# Patient Record
Sex: FEMALE | Race: OTHER | HISPANIC OR LATINO | URBAN - METROPOLITAN AREA
[De-identification: names, ages, dates, MRNs, and addresses within clinical notes are randomized per-mention and may not be internally consistent; named-entity substitution may affect disease eponyms.]

---

## 2020-01-01 ENCOUNTER — INPATIENT (INPATIENT)
Facility: HOSPITAL | Age: 85
LOS: 6 days | End: 2020-04-30
Attending: ORTHOPAEDIC SURGERY | Admitting: ORTHOPAEDIC SURGERY
Payer: MEDICARE

## 2020-01-01 VITALS
HEART RATE: 97 BPM | SYSTOLIC BLOOD PRESSURE: 126 MMHG | DIASTOLIC BLOOD PRESSURE: 74 MMHG | TEMPERATURE: 98 F | RESPIRATION RATE: 18 BRPM | OXYGEN SATURATION: 97 %

## 2020-01-01 VITALS
HEART RATE: 80 BPM | TEMPERATURE: 97 F | RESPIRATION RATE: 20 BRPM | OXYGEN SATURATION: 90 % | DIASTOLIC BLOOD PRESSURE: 61 MMHG | SYSTOLIC BLOOD PRESSURE: 134 MMHG

## 2020-01-01 DIAGNOSIS — I48.91 UNSPECIFIED ATRIAL FIBRILLATION: ICD-10-CM

## 2020-01-01 DIAGNOSIS — J96.01 ACUTE RESPIRATORY FAILURE WITH HYPOXIA: ICD-10-CM

## 2020-01-01 DIAGNOSIS — E87.6 HYPOKALEMIA: ICD-10-CM

## 2020-01-01 DIAGNOSIS — R13.10 DYSPHAGIA, UNSPECIFIED: ICD-10-CM

## 2020-01-01 DIAGNOSIS — I25.10 ATHEROSCLEROTIC HEART DISEASE OF NATIVE CORONARY ARTERY WITHOUT ANGINA PECTORIS: ICD-10-CM

## 2020-01-01 DIAGNOSIS — E11.9 TYPE 2 DIABETES MELLITUS WITHOUT COMPLICATIONS: ICD-10-CM

## 2020-01-01 DIAGNOSIS — I63.9 CEREBRAL INFARCTION, UNSPECIFIED: ICD-10-CM

## 2020-01-01 DIAGNOSIS — Z71.89 OTHER SPECIFIED COUNSELING: ICD-10-CM

## 2020-01-01 DIAGNOSIS — Z29.9 ENCOUNTER FOR PROPHYLACTIC MEASURES, UNSPECIFIED: ICD-10-CM

## 2020-01-01 DIAGNOSIS — R06.03 ACUTE RESPIRATORY DISTRESS: ICD-10-CM

## 2020-01-01 DIAGNOSIS — E78.5 HYPERLIPIDEMIA, UNSPECIFIED: ICD-10-CM

## 2020-01-01 DIAGNOSIS — S72.141A DISPLACED INTERTROCHANTERIC FRACTURE OF RIGHT FEMUR, INITIAL ENCOUNTER FOR CLOSED FRACTURE: ICD-10-CM

## 2020-01-01 DIAGNOSIS — Z01.818 ENCOUNTER FOR OTHER PREPROCEDURAL EXAMINATION: ICD-10-CM

## 2020-01-01 DIAGNOSIS — Z51.5 ENCOUNTER FOR PALLIATIVE CARE: ICD-10-CM

## 2020-01-01 DIAGNOSIS — I10 ESSENTIAL (PRIMARY) HYPERTENSION: ICD-10-CM

## 2020-01-01 LAB
24R-OH-CALCIDIOL SERPL-MCNC: 8.1 NG/ML — LOW (ref 30–80)
ALBUMIN SERPL ELPH-MCNC: 3.7 G/DL — SIGNIFICANT CHANGE UP (ref 3.3–5)
ALBUMIN SERPL ELPH-MCNC: 3.8 G/DL — SIGNIFICANT CHANGE UP (ref 3.3–5)
ALP SERPL-CCNC: 100 U/L — SIGNIFICANT CHANGE UP (ref 40–120)
ALT FLD-CCNC: 13 U/L — SIGNIFICANT CHANGE UP (ref 4–33)
ANION GAP SERPL CALC-SCNC: 11 MMO/L — SIGNIFICANT CHANGE UP (ref 7–14)
ANION GAP SERPL CALC-SCNC: 13 MMO/L — SIGNIFICANT CHANGE UP (ref 7–14)
ANION GAP SERPL CALC-SCNC: 13 MMO/L — SIGNIFICANT CHANGE UP (ref 7–14)
ANION GAP SERPL CALC-SCNC: 14 MMO/L — SIGNIFICANT CHANGE UP (ref 7–14)
ANION GAP SERPL CALC-SCNC: 15 MMO/L — HIGH (ref 7–14)
ANION GAP SERPL CALC-SCNC: 16 MMO/L — HIGH (ref 7–14)
ANION GAP SERPL CALC-SCNC: 17 MMO/L — HIGH (ref 7–14)
ANION GAP SERPL CALC-SCNC: 20 MMO/L — HIGH (ref 7–14)
APTT BLD: 20.7 SEC — LOW (ref 27.5–36.3)
APTT BLD: 22.8 SEC — LOW (ref 27.5–36.3)
APTT BLD: 23.1 SEC — LOW (ref 27.5–36.3)
APTT BLD: 24.1 SEC — LOW (ref 27.5–36.3)
APTT BLD: 28 SEC — SIGNIFICANT CHANGE UP (ref 27.5–36.3)
AST SERPL-CCNC: 14 U/L — SIGNIFICANT CHANGE UP (ref 4–32)
BASE EXCESS BLDV CALC-SCNC: -3.6 MMOL/L — SIGNIFICANT CHANGE UP
BASOPHILS # BLD AUTO: 0.02 K/UL — SIGNIFICANT CHANGE UP (ref 0–0.2)
BASOPHILS NFR BLD AUTO: 0.2 % — SIGNIFICANT CHANGE UP (ref 0–2)
BILIRUB SERPL-MCNC: 0.7 MG/DL — SIGNIFICANT CHANGE UP (ref 0.2–1.2)
BLD GP AB SCN SERPL QL: NEGATIVE — SIGNIFICANT CHANGE UP
BLOOD GAS VENOUS - CREATININE: 0.77 MG/DL — SIGNIFICANT CHANGE UP (ref 0.5–1.3)
BUN SERPL-MCNC: 10 MG/DL — SIGNIFICANT CHANGE UP (ref 7–23)
BUN SERPL-MCNC: 11 MG/DL — SIGNIFICANT CHANGE UP (ref 7–23)
BUN SERPL-MCNC: 12 MG/DL — SIGNIFICANT CHANGE UP (ref 7–23)
BUN SERPL-MCNC: 13 MG/DL — SIGNIFICANT CHANGE UP (ref 7–23)
BUN SERPL-MCNC: 14 MG/DL — SIGNIFICANT CHANGE UP (ref 7–23)
BUN SERPL-MCNC: 15 MG/DL — SIGNIFICANT CHANGE UP (ref 7–23)
BUN SERPL-MCNC: 17 MG/DL — SIGNIFICANT CHANGE UP (ref 7–23)
BUN SERPL-MCNC: 18 MG/DL — SIGNIFICANT CHANGE UP (ref 7–23)
BUN SERPL-MCNC: 19 MG/DL — SIGNIFICANT CHANGE UP (ref 7–23)
BUN SERPL-MCNC: 20 MG/DL — SIGNIFICANT CHANGE UP (ref 7–23)
BUN SERPL-MCNC: 24 MG/DL — HIGH (ref 7–23)
BUN SERPL-MCNC: 29 MG/DL — HIGH (ref 7–23)
BUN SERPL-MCNC: 9 MG/DL — SIGNIFICANT CHANGE UP (ref 7–23)
CALCIUM SERPL-MCNC: 7.7 MG/DL — LOW (ref 8.4–10.5)
CALCIUM SERPL-MCNC: 7.9 MG/DL — LOW (ref 8.4–10.5)
CALCIUM SERPL-MCNC: 8 MG/DL — LOW (ref 8.4–10.5)
CALCIUM SERPL-MCNC: 8.1 MG/DL — LOW (ref 8.4–10.5)
CALCIUM SERPL-MCNC: 8.2 MG/DL — LOW (ref 8.4–10.5)
CALCIUM SERPL-MCNC: 8.2 MG/DL — LOW (ref 8.4–10.5)
CALCIUM SERPL-MCNC: 8.4 MG/DL — SIGNIFICANT CHANGE UP (ref 8.4–10.5)
CALCIUM SERPL-MCNC: 8.4 MG/DL — SIGNIFICANT CHANGE UP (ref 8.4–10.5)
CALCIUM SERPL-MCNC: 8.6 MG/DL — SIGNIFICANT CHANGE UP (ref 8.4–10.5)
CALCIUM SERPL-MCNC: 8.7 MG/DL — SIGNIFICANT CHANGE UP (ref 8.4–10.5)
CALCIUM SERPL-MCNC: 9 MG/DL — SIGNIFICANT CHANGE UP (ref 8.4–10.5)
CHLORIDE BLDV-SCNC: 104 MMOL/L — SIGNIFICANT CHANGE UP (ref 96–108)
CHLORIDE SERPL-SCNC: 101 MMOL/L — SIGNIFICANT CHANGE UP (ref 98–107)
CHLORIDE SERPL-SCNC: 101 MMOL/L — SIGNIFICANT CHANGE UP (ref 98–107)
CHLORIDE SERPL-SCNC: 102 MMOL/L — SIGNIFICANT CHANGE UP (ref 98–107)
CHLORIDE SERPL-SCNC: 103 MMOL/L — SIGNIFICANT CHANGE UP (ref 98–107)
CHLORIDE SERPL-SCNC: 103 MMOL/L — SIGNIFICANT CHANGE UP (ref 98–107)
CHLORIDE SERPL-SCNC: 106 MMOL/L — SIGNIFICANT CHANGE UP (ref 98–107)
CHLORIDE SERPL-SCNC: 106 MMOL/L — SIGNIFICANT CHANGE UP (ref 98–107)
CHLORIDE SERPL-SCNC: 107 MMOL/L — SIGNIFICANT CHANGE UP (ref 98–107)
CHLORIDE SERPL-SCNC: 109 MMOL/L — HIGH (ref 98–107)
CHLORIDE SERPL-SCNC: 99 MMOL/L — SIGNIFICANT CHANGE UP (ref 98–107)
CHLORIDE SERPL-SCNC: 99 MMOL/L — SIGNIFICANT CHANGE UP (ref 98–107)
CHOLEST SERPL-MCNC: 101 MG/DL — LOW (ref 120–199)
CHOLEST SERPL-MCNC: 95 MG/DL — LOW (ref 120–199)
CO2 SERPL-SCNC: 14 MMOL/L — LOW (ref 22–31)
CO2 SERPL-SCNC: 16 MMOL/L — LOW (ref 22–31)
CO2 SERPL-SCNC: 17 MMOL/L — LOW (ref 22–31)
CO2 SERPL-SCNC: 18 MMOL/L — LOW (ref 22–31)
CO2 SERPL-SCNC: 19 MMOL/L — LOW (ref 22–31)
CO2 SERPL-SCNC: 20 MMOL/L — LOW (ref 22–31)
CO2 SERPL-SCNC: 22 MMOL/L — SIGNIFICANT CHANGE UP (ref 22–31)
CREAT SERPL-MCNC: 0.49 MG/DL — LOW (ref 0.5–1.3)
CREAT SERPL-MCNC: 0.52 MG/DL — SIGNIFICANT CHANGE UP (ref 0.5–1.3)
CREAT SERPL-MCNC: 0.53 MG/DL — SIGNIFICANT CHANGE UP (ref 0.5–1.3)
CREAT SERPL-MCNC: 0.58 MG/DL — SIGNIFICANT CHANGE UP (ref 0.5–1.3)
CREAT SERPL-MCNC: 0.58 MG/DL — SIGNIFICANT CHANGE UP (ref 0.5–1.3)
CREAT SERPL-MCNC: 0.6 MG/DL — SIGNIFICANT CHANGE UP (ref 0.5–1.3)
CREAT SERPL-MCNC: 0.61 MG/DL — SIGNIFICANT CHANGE UP (ref 0.5–1.3)
CREAT SERPL-MCNC: 0.67 MG/DL — SIGNIFICANT CHANGE UP (ref 0.5–1.3)
CREAT SERPL-MCNC: 0.68 MG/DL — SIGNIFICANT CHANGE UP (ref 0.5–1.3)
CREAT SERPL-MCNC: 0.71 MG/DL — SIGNIFICANT CHANGE UP (ref 0.5–1.3)
CREAT SERPL-MCNC: 0.71 MG/DL — SIGNIFICANT CHANGE UP (ref 0.5–1.3)
CREAT SERPL-MCNC: 0.72 MG/DL — SIGNIFICANT CHANGE UP (ref 0.5–1.3)
CREAT SERPL-MCNC: 0.73 MG/DL — SIGNIFICANT CHANGE UP (ref 0.5–1.3)
EOSINOPHIL # BLD AUTO: 0.01 K/UL — SIGNIFICANT CHANGE UP (ref 0–0.5)
EOSINOPHIL NFR BLD AUTO: 0.1 % — SIGNIFICANT CHANGE UP (ref 0–6)
GAS PNL BLDV: 133 MMOL/L — LOW (ref 136–146)
GLUCOSE BLDC GLUCOMTR-MCNC: 188 MG/DL — HIGH (ref 70–99)
GLUCOSE BLDC GLUCOMTR-MCNC: 205 MG/DL — HIGH (ref 70–99)
GLUCOSE BLDC GLUCOMTR-MCNC: 205 MG/DL — HIGH (ref 70–99)
GLUCOSE BLDC GLUCOMTR-MCNC: 229 MG/DL — HIGH (ref 70–99)
GLUCOSE BLDC GLUCOMTR-MCNC: 236 MG/DL — HIGH (ref 70–99)
GLUCOSE BLDC GLUCOMTR-MCNC: 237 MG/DL — HIGH (ref 70–99)
GLUCOSE BLDC GLUCOMTR-MCNC: 241 MG/DL — HIGH (ref 70–99)
GLUCOSE BLDC GLUCOMTR-MCNC: 246 MG/DL — HIGH (ref 70–99)
GLUCOSE BLDC GLUCOMTR-MCNC: 259 MG/DL — HIGH (ref 70–99)
GLUCOSE BLDC GLUCOMTR-MCNC: 261 MG/DL — HIGH (ref 70–99)
GLUCOSE BLDC GLUCOMTR-MCNC: 274 MG/DL — HIGH (ref 70–99)
GLUCOSE BLDC GLUCOMTR-MCNC: 275 MG/DL — HIGH (ref 70–99)
GLUCOSE BLDC GLUCOMTR-MCNC: 275 MG/DL — HIGH (ref 70–99)
GLUCOSE BLDC GLUCOMTR-MCNC: 283 MG/DL — HIGH (ref 70–99)
GLUCOSE BLDC GLUCOMTR-MCNC: 287 MG/DL — HIGH (ref 70–99)
GLUCOSE BLDC GLUCOMTR-MCNC: 289 MG/DL — HIGH (ref 70–99)
GLUCOSE BLDC GLUCOMTR-MCNC: 293 MG/DL — HIGH (ref 70–99)
GLUCOSE BLDC GLUCOMTR-MCNC: 295 MG/DL — HIGH (ref 70–99)
GLUCOSE BLDC GLUCOMTR-MCNC: 299 MG/DL — HIGH (ref 70–99)
GLUCOSE BLDC GLUCOMTR-MCNC: 309 MG/DL — HIGH (ref 70–99)
GLUCOSE BLDC GLUCOMTR-MCNC: 316 MG/DL — HIGH (ref 70–99)
GLUCOSE BLDC GLUCOMTR-MCNC: 320 MG/DL — HIGH (ref 70–99)
GLUCOSE BLDC GLUCOMTR-MCNC: 321 MG/DL — HIGH (ref 70–99)
GLUCOSE BLDC GLUCOMTR-MCNC: 330 MG/DL — HIGH (ref 70–99)
GLUCOSE BLDC GLUCOMTR-MCNC: 333 MG/DL — HIGH (ref 70–99)
GLUCOSE BLDC GLUCOMTR-MCNC: 344 MG/DL — HIGH (ref 70–99)
GLUCOSE BLDC GLUCOMTR-MCNC: 353 MG/DL — HIGH (ref 70–99)
GLUCOSE BLDC GLUCOMTR-MCNC: 353 MG/DL — HIGH (ref 70–99)
GLUCOSE BLDC GLUCOMTR-MCNC: 355 MG/DL — HIGH (ref 70–99)
GLUCOSE BLDC GLUCOMTR-MCNC: 379 MG/DL — HIGH (ref 70–99)
GLUCOSE BLDC GLUCOMTR-MCNC: 424 MG/DL — HIGH (ref 70–99)
GLUCOSE BLDC GLUCOMTR-MCNC: 434 MG/DL — HIGH (ref 70–99)
GLUCOSE BLDC GLUCOMTR-MCNC: 467 MG/DL — CRITICAL HIGH (ref 70–99)
GLUCOSE BLDV-MCNC: 319 MG/DL — HIGH (ref 70–99)
GLUCOSE SERPL-MCNC: 119 MG/DL — HIGH (ref 70–99)
GLUCOSE SERPL-MCNC: 199 MG/DL — HIGH (ref 70–99)
GLUCOSE SERPL-MCNC: 237 MG/DL — HIGH (ref 70–99)
GLUCOSE SERPL-MCNC: 265 MG/DL — HIGH (ref 70–99)
GLUCOSE SERPL-MCNC: 280 MG/DL — HIGH (ref 70–99)
GLUCOSE SERPL-MCNC: 288 MG/DL — HIGH (ref 70–99)
GLUCOSE SERPL-MCNC: 294 MG/DL — HIGH (ref 70–99)
GLUCOSE SERPL-MCNC: 299 MG/DL — HIGH (ref 70–99)
GLUCOSE SERPL-MCNC: 310 MG/DL — HIGH (ref 70–99)
GLUCOSE SERPL-MCNC: 335 MG/DL — HIGH (ref 70–99)
GLUCOSE SERPL-MCNC: 336 MG/DL — HIGH (ref 70–99)
GLUCOSE SERPL-MCNC: 372 MG/DL — HIGH (ref 70–99)
GLUCOSE SERPL-MCNC: 417 MG/DL — HIGH (ref 70–99)
HBA1C BLD-MCNC: 8.4 % — HIGH (ref 4–5.6)
HBA1C BLD-MCNC: 8.8 % — HIGH (ref 4–5.6)
HCO3 BLDV-SCNC: 21 MMOL/L — SIGNIFICANT CHANGE UP (ref 20–27)
HCT VFR BLD CALC: 26.3 % — LOW (ref 34.5–45)
HCT VFR BLD CALC: 26.4 % — LOW (ref 34.5–45)
HCT VFR BLD CALC: 27.6 % — LOW (ref 34.5–45)
HCT VFR BLD CALC: 28 % — LOW (ref 34.5–45)
HCT VFR BLD CALC: 28.3 % — LOW (ref 34.5–45)
HCT VFR BLD CALC: 30.9 % — LOW (ref 34.5–45)
HCT VFR BLD CALC: 32.8 % — LOW (ref 34.5–45)
HCT VFR BLD CALC: 32.9 % — LOW (ref 34.5–45)
HCT VFR BLD CALC: 33.2 % — LOW (ref 34.5–45)
HCT VFR BLD CALC: 35.3 % — SIGNIFICANT CHANGE UP (ref 34.5–45)
HCT VFR BLDV CALC: 37.7 % — SIGNIFICANT CHANGE UP (ref 34.5–45)
HDLC SERPL-MCNC: 46 MG/DL — SIGNIFICANT CHANGE UP (ref 45–65)
HDLC SERPL-MCNC: 47 MG/DL — SIGNIFICANT CHANGE UP (ref 45–65)
HGB BLD-MCNC: 10.1 G/DL — LOW (ref 11.5–15.5)
HGB BLD-MCNC: 10.5 G/DL — LOW (ref 11.5–15.5)
HGB BLD-MCNC: 11 G/DL — LOW (ref 11.5–15.5)
HGB BLD-MCNC: 11.3 G/DL — LOW (ref 11.5–15.5)
HGB BLD-MCNC: 11.6 G/DL — SIGNIFICANT CHANGE UP (ref 11.5–15.5)
HGB BLD-MCNC: 8.8 G/DL — LOW (ref 11.5–15.5)
HGB BLD-MCNC: 8.8 G/DL — LOW (ref 11.5–15.5)
HGB BLD-MCNC: 9.1 G/DL — LOW (ref 11.5–15.5)
HGB BLD-MCNC: 9.2 G/DL — LOW (ref 11.5–15.5)
HGB BLD-MCNC: 9.3 G/DL — LOW (ref 11.5–15.5)
HGB BLDV-MCNC: 12.3 G/DL — SIGNIFICANT CHANGE UP (ref 11.5–15.5)
IMM GRANULOCYTES NFR BLD AUTO: 1.4 % — SIGNIFICANT CHANGE UP (ref 0–1.5)
INR BLD: 1.02 — SIGNIFICANT CHANGE UP (ref 0.88–1.17)
INR BLD: 1.03 — SIGNIFICANT CHANGE UP (ref 0.88–1.17)
INR BLD: 1.08 — SIGNIFICANT CHANGE UP (ref 0.88–1.17)
INR BLD: 1.15 — SIGNIFICANT CHANGE UP (ref 0.88–1.17)
INR BLD: 1.16 — SIGNIFICANT CHANGE UP (ref 0.88–1.17)
LACTATE BLDV-MCNC: 1.7 MMOL/L — SIGNIFICANT CHANGE UP (ref 0.5–2)
LIPID PNL WITH DIRECT LDL SERPL: 37 MG/DL — SIGNIFICANT CHANGE UP
LIPID PNL WITH DIRECT LDL SERPL: 38 MG/DL — SIGNIFICANT CHANGE UP
LYMPHOCYTES # BLD AUTO: 1.52 K/UL — SIGNIFICANT CHANGE UP (ref 1–3.3)
LYMPHOCYTES # BLD AUTO: 14.3 % — SIGNIFICANT CHANGE UP (ref 13–44)
MAGNESIUM SERPL-MCNC: 1.3 MG/DL — LOW (ref 1.6–2.6)
MAGNESIUM SERPL-MCNC: 1.4 MG/DL — LOW (ref 1.6–2.6)
MAGNESIUM SERPL-MCNC: 1.8 MG/DL — SIGNIFICANT CHANGE UP (ref 1.6–2.6)
MAGNESIUM SERPL-MCNC: 1.8 MG/DL — SIGNIFICANT CHANGE UP (ref 1.6–2.6)
MCHC RBC-ENTMCNC: 30.9 PG — SIGNIFICANT CHANGE UP (ref 27–34)
MCHC RBC-ENTMCNC: 31 PG — SIGNIFICANT CHANGE UP (ref 27–34)
MCHC RBC-ENTMCNC: 31 PG — SIGNIFICANT CHANGE UP (ref 27–34)
MCHC RBC-ENTMCNC: 31.1 PG — SIGNIFICANT CHANGE UP (ref 27–34)
MCHC RBC-ENTMCNC: 31.1 PG — SIGNIFICANT CHANGE UP (ref 27–34)
MCHC RBC-ENTMCNC: 31.4 PG — SIGNIFICANT CHANGE UP (ref 27–34)
MCHC RBC-ENTMCNC: 31.5 PG — SIGNIFICANT CHANGE UP (ref 27–34)
MCHC RBC-ENTMCNC: 31.6 PG — SIGNIFICANT CHANGE UP (ref 27–34)
MCHC RBC-ENTMCNC: 31.7 PG — SIGNIFICANT CHANGE UP (ref 27–34)
MCHC RBC-ENTMCNC: 31.9 % — LOW (ref 32–36)
MCHC RBC-ENTMCNC: 32.1 PG — SIGNIFICANT CHANGE UP (ref 27–34)
MCHC RBC-ENTMCNC: 32.5 % — SIGNIFICANT CHANGE UP (ref 32–36)
MCHC RBC-ENTMCNC: 32.7 % — SIGNIFICANT CHANGE UP (ref 32–36)
MCHC RBC-ENTMCNC: 32.9 % — SIGNIFICANT CHANGE UP (ref 32–36)
MCHC RBC-ENTMCNC: 33 % — SIGNIFICANT CHANGE UP (ref 32–36)
MCHC RBC-ENTMCNC: 33.2 % — SIGNIFICANT CHANGE UP (ref 32–36)
MCHC RBC-ENTMCNC: 33.3 % — SIGNIFICANT CHANGE UP (ref 32–36)
MCHC RBC-ENTMCNC: 33.5 % — SIGNIFICANT CHANGE UP (ref 32–36)
MCHC RBC-ENTMCNC: 33.5 % — SIGNIFICANT CHANGE UP (ref 32–36)
MCHC RBC-ENTMCNC: 34 % — SIGNIFICANT CHANGE UP (ref 32–36)
MCV RBC AUTO: 92.6 FL — SIGNIFICANT CHANGE UP (ref 80–100)
MCV RBC AUTO: 93 FL — SIGNIFICANT CHANGE UP (ref 80–100)
MCV RBC AUTO: 93.3 FL — SIGNIFICANT CHANGE UP (ref 80–100)
MCV RBC AUTO: 94.2 FL — SIGNIFICANT CHANGE UP (ref 80–100)
MCV RBC AUTO: 94.3 FL — SIGNIFICANT CHANGE UP (ref 80–100)
MCV RBC AUTO: 94.6 FL — SIGNIFICANT CHANGE UP (ref 80–100)
MCV RBC AUTO: 96 FL — SIGNIFICANT CHANGE UP (ref 80–100)
MCV RBC AUTO: 96.3 FL — SIGNIFICANT CHANGE UP (ref 80–100)
MCV RBC AUTO: 96.6 FL — SIGNIFICANT CHANGE UP (ref 80–100)
MCV RBC AUTO: 97.1 FL — SIGNIFICANT CHANGE UP (ref 80–100)
MONOCYTES # BLD AUTO: 0.96 K/UL — HIGH (ref 0–0.9)
MONOCYTES NFR BLD AUTO: 9 % — SIGNIFICANT CHANGE UP (ref 2–14)
NEUTROPHILS # BLD AUTO: 7.98 K/UL — HIGH (ref 1.8–7.4)
NEUTROPHILS NFR BLD AUTO: 75 % — SIGNIFICANT CHANGE UP (ref 43–77)
NRBC # FLD: 0 K/UL — SIGNIFICANT CHANGE UP (ref 0–0)
NRBC # FLD: 0.02 K/UL — SIGNIFICANT CHANGE UP (ref 0–0)
NRBC # FLD: 0.12 K/UL — SIGNIFICANT CHANGE UP (ref 0–0)
NRBC # FLD: 0.2 K/UL — SIGNIFICANT CHANGE UP (ref 0–0)
PCO2 BLDV: 35 MMHG — LOW (ref 41–51)
PH BLDV: 7.39 PH — SIGNIFICANT CHANGE UP (ref 7.32–7.43)
PHOSPHATE SERPL-MCNC: 1.8 MG/DL — LOW (ref 2.5–4.5)
PHOSPHATE SERPL-MCNC: 2 MG/DL — LOW (ref 2.5–4.5)
PHOSPHATE SERPL-MCNC: 2.6 MG/DL — SIGNIFICANT CHANGE UP (ref 2.5–4.5)
PHOSPHATE SERPL-MCNC: 3.1 MG/DL — SIGNIFICANT CHANGE UP (ref 2.5–4.5)
PLATELET # BLD AUTO: 231 K/UL — SIGNIFICANT CHANGE UP (ref 150–400)
PLATELET # BLD AUTO: 234 K/UL — SIGNIFICANT CHANGE UP (ref 150–400)
PLATELET # BLD AUTO: 255 K/UL — SIGNIFICANT CHANGE UP (ref 150–400)
PLATELET # BLD AUTO: 263 K/UL — SIGNIFICANT CHANGE UP (ref 150–400)
PLATELET # BLD AUTO: 269 K/UL — SIGNIFICANT CHANGE UP (ref 150–400)
PLATELET # BLD AUTO: 286 K/UL — SIGNIFICANT CHANGE UP (ref 150–400)
PLATELET # BLD AUTO: 290 K/UL — SIGNIFICANT CHANGE UP (ref 150–400)
PLATELET # BLD AUTO: 324 K/UL — SIGNIFICANT CHANGE UP (ref 150–400)
PLATELET # BLD AUTO: 392 K/UL — SIGNIFICANT CHANGE UP (ref 150–400)
PLATELET # BLD AUTO: 435 K/UL — HIGH (ref 150–400)
PMV BLD: 11.8 FL — SIGNIFICANT CHANGE UP (ref 7–13)
PMV BLD: 11.9 FL — SIGNIFICANT CHANGE UP (ref 7–13)
PMV BLD: 12 FL — SIGNIFICANT CHANGE UP (ref 7–13)
PMV BLD: 12.2 FL — SIGNIFICANT CHANGE UP (ref 7–13)
PMV BLD: 12.3 FL — SIGNIFICANT CHANGE UP (ref 7–13)
PMV BLD: 12.3 FL — SIGNIFICANT CHANGE UP (ref 7–13)
PMV BLD: 12.5 FL — SIGNIFICANT CHANGE UP (ref 7–13)
PMV BLD: 12.8 FL — SIGNIFICANT CHANGE UP (ref 7–13)
PMV BLD: 12.8 FL — SIGNIFICANT CHANGE UP (ref 7–13)
PMV BLD: SIGNIFICANT CHANGE UP FL (ref 7–13)
PO2 BLDV: 30 MMHG — LOW (ref 35–40)
POTASSIUM BLDV-SCNC: 3.4 MMOL/L — SIGNIFICANT CHANGE UP (ref 3.4–4.5)
POTASSIUM SERPL-MCNC: 2.6 MMOL/L — CRITICAL LOW (ref 3.5–5.3)
POTASSIUM SERPL-MCNC: 2.8 MMOL/L — CRITICAL LOW (ref 3.5–5.3)
POTASSIUM SERPL-MCNC: 3 MMOL/L — LOW (ref 3.5–5.3)
POTASSIUM SERPL-MCNC: 3.1 MMOL/L — LOW (ref 3.5–5.3)
POTASSIUM SERPL-MCNC: 3.2 MMOL/L — LOW (ref 3.5–5.3)
POTASSIUM SERPL-MCNC: 3.3 MMOL/L — LOW (ref 3.5–5.3)
POTASSIUM SERPL-MCNC: 3.3 MMOL/L — LOW (ref 3.5–5.3)
POTASSIUM SERPL-MCNC: 3.6 MMOL/L — SIGNIFICANT CHANGE UP (ref 3.5–5.3)
POTASSIUM SERPL-MCNC: 3.8 MMOL/L — SIGNIFICANT CHANGE UP (ref 3.5–5.3)
POTASSIUM SERPL-MCNC: 3.8 MMOL/L — SIGNIFICANT CHANGE UP (ref 3.5–5.3)
POTASSIUM SERPL-MCNC: 4.1 MMOL/L — SIGNIFICANT CHANGE UP (ref 3.5–5.3)
POTASSIUM SERPL-MCNC: 4.2 MMOL/L — SIGNIFICANT CHANGE UP (ref 3.5–5.3)
POTASSIUM SERPL-MCNC: 4.9 MMOL/L — SIGNIFICANT CHANGE UP (ref 3.5–5.3)
POTASSIUM SERPL-SCNC: 2.6 MMOL/L — CRITICAL LOW (ref 3.5–5.3)
POTASSIUM SERPL-SCNC: 2.8 MMOL/L — CRITICAL LOW (ref 3.5–5.3)
POTASSIUM SERPL-SCNC: 3 MMOL/L — LOW (ref 3.5–5.3)
POTASSIUM SERPL-SCNC: 3.1 MMOL/L — LOW (ref 3.5–5.3)
POTASSIUM SERPL-SCNC: 3.2 MMOL/L — LOW (ref 3.5–5.3)
POTASSIUM SERPL-SCNC: 3.3 MMOL/L — LOW (ref 3.5–5.3)
POTASSIUM SERPL-SCNC: 3.3 MMOL/L — LOW (ref 3.5–5.3)
POTASSIUM SERPL-SCNC: 3.6 MMOL/L — SIGNIFICANT CHANGE UP (ref 3.5–5.3)
POTASSIUM SERPL-SCNC: 3.8 MMOL/L — SIGNIFICANT CHANGE UP (ref 3.5–5.3)
POTASSIUM SERPL-SCNC: 3.8 MMOL/L — SIGNIFICANT CHANGE UP (ref 3.5–5.3)
POTASSIUM SERPL-SCNC: 4.1 MMOL/L — SIGNIFICANT CHANGE UP (ref 3.5–5.3)
POTASSIUM SERPL-SCNC: 4.2 MMOL/L — SIGNIFICANT CHANGE UP (ref 3.5–5.3)
POTASSIUM SERPL-SCNC: 4.9 MMOL/L — SIGNIFICANT CHANGE UP (ref 3.5–5.3)
PROT SERPL-MCNC: 7 G/DL — SIGNIFICANT CHANGE UP (ref 6–8.3)
PROTHROM AB SERPL-ACNC: 11.6 SEC — SIGNIFICANT CHANGE UP (ref 9.8–13.1)
PROTHROM AB SERPL-ACNC: 11.7 SEC — SIGNIFICANT CHANGE UP (ref 9.8–13.1)
PROTHROM AB SERPL-ACNC: 12.4 SEC — SIGNIFICANT CHANGE UP (ref 9.8–13.1)
PROTHROM AB SERPL-ACNC: 13.2 SEC — HIGH (ref 9.8–13.1)
PROTHROM AB SERPL-ACNC: 13.3 SEC — HIGH (ref 9.8–13.1)
RBC # BLD: 2.74 M/UL — LOW (ref 3.8–5.2)
RBC # BLD: 2.85 M/UL — LOW (ref 3.8–5.2)
RBC # BLD: 2.93 M/UL — LOW (ref 3.8–5.2)
RBC # BLD: 2.93 M/UL — LOW (ref 3.8–5.2)
RBC # BLD: 3 M/UL — LOW (ref 3.8–5.2)
RBC # BLD: 3.21 M/UL — LOW (ref 3.8–5.2)
RBC # BLD: 3.39 M/UL — LOW (ref 3.8–5.2)
RBC # BLD: 3.48 M/UL — LOW (ref 3.8–5.2)
RBC # BLD: 3.57 M/UL — LOW (ref 3.8–5.2)
RBC # BLD: 3.73 M/UL — LOW (ref 3.8–5.2)
RBC # FLD: 12.6 % — SIGNIFICANT CHANGE UP (ref 10.3–14.5)
RBC # FLD: 12.7 % — SIGNIFICANT CHANGE UP (ref 10.3–14.5)
RBC # FLD: 12.8 % — SIGNIFICANT CHANGE UP (ref 10.3–14.5)
RBC # FLD: 13.1 % — SIGNIFICANT CHANGE UP (ref 10.3–14.5)
RBC # FLD: 13.2 % — SIGNIFICANT CHANGE UP (ref 10.3–14.5)
RBC # FLD: 13.7 % — SIGNIFICANT CHANGE UP (ref 10.3–14.5)
RBC # FLD: 13.7 % — SIGNIFICANT CHANGE UP (ref 10.3–14.5)
RH IG SCN BLD-IMP: POSITIVE — SIGNIFICANT CHANGE UP
SAO2 % BLDV: 45.8 % — LOW (ref 60–85)
SARS-COV-2 RNA SPEC QL NAA+PROBE: SIGNIFICANT CHANGE UP
SARS-COV-2 RNA SPEC QL NAA+PROBE: SIGNIFICANT CHANGE UP
SODIUM SERPL-SCNC: 133 MMOL/L — LOW (ref 135–145)
SODIUM SERPL-SCNC: 135 MMOL/L — SIGNIFICANT CHANGE UP (ref 135–145)
SODIUM SERPL-SCNC: 136 MMOL/L — SIGNIFICANT CHANGE UP (ref 135–145)
SODIUM SERPL-SCNC: 136 MMOL/L — SIGNIFICANT CHANGE UP (ref 135–145)
SODIUM SERPL-SCNC: 137 MMOL/L — SIGNIFICANT CHANGE UP (ref 135–145)
SODIUM SERPL-SCNC: 138 MMOL/L — SIGNIFICANT CHANGE UP (ref 135–145)
SODIUM SERPL-SCNC: 138 MMOL/L — SIGNIFICANT CHANGE UP (ref 135–145)
SODIUM SERPL-SCNC: 139 MMOL/L — SIGNIFICANT CHANGE UP (ref 135–145)
SODIUM SERPL-SCNC: 139 MMOL/L — SIGNIFICANT CHANGE UP (ref 135–145)
SODIUM SERPL-SCNC: 140 MMOL/L — SIGNIFICANT CHANGE UP (ref 135–145)
SODIUM SERPL-SCNC: 141 MMOL/L — SIGNIFICANT CHANGE UP (ref 135–145)
TRIGL SERPL-MCNC: 142 MG/DL — SIGNIFICANT CHANGE UP (ref 10–149)
TRIGL SERPL-MCNC: 145 MG/DL — SIGNIFICANT CHANGE UP (ref 10–149)
TROPONIN T, HIGH SENSITIVITY: 29 NG/L — SIGNIFICANT CHANGE UP (ref ?–14)
WBC # BLD: 10.03 K/UL — SIGNIFICANT CHANGE UP (ref 3.8–10.5)
WBC # BLD: 10.64 K/UL — HIGH (ref 3.8–10.5)
WBC # BLD: 11.09 K/UL — HIGH (ref 3.8–10.5)
WBC # BLD: 16.05 K/UL — HIGH (ref 3.8–10.5)
WBC # BLD: 20.41 K/UL — HIGH (ref 3.8–10.5)
WBC # BLD: 23.44 K/UL — HIGH (ref 3.8–10.5)
WBC # BLD: 9.22 K/UL — SIGNIFICANT CHANGE UP (ref 3.8–10.5)
WBC # BLD: 9.24 K/UL — SIGNIFICANT CHANGE UP (ref 3.8–10.5)
WBC # BLD: 9.5 K/UL — SIGNIFICANT CHANGE UP (ref 3.8–10.5)
WBC # BLD: 9.71 K/UL — SIGNIFICANT CHANGE UP (ref 3.8–10.5)
WBC # FLD AUTO: 10.03 K/UL — SIGNIFICANT CHANGE UP (ref 3.8–10.5)
WBC # FLD AUTO: 10.64 K/UL — HIGH (ref 3.8–10.5)
WBC # FLD AUTO: 11.09 K/UL — HIGH (ref 3.8–10.5)
WBC # FLD AUTO: 16.05 K/UL — HIGH (ref 3.8–10.5)
WBC # FLD AUTO: 20.41 K/UL — HIGH (ref 3.8–10.5)
WBC # FLD AUTO: 23.44 K/UL — HIGH (ref 3.8–10.5)
WBC # FLD AUTO: 9.22 K/UL — SIGNIFICANT CHANGE UP (ref 3.8–10.5)
WBC # FLD AUTO: 9.24 K/UL — SIGNIFICANT CHANGE UP (ref 3.8–10.5)
WBC # FLD AUTO: 9.5 K/UL — SIGNIFICANT CHANGE UP (ref 3.8–10.5)
WBC # FLD AUTO: 9.71 K/UL — SIGNIFICANT CHANGE UP (ref 3.8–10.5)

## 2020-01-01 PROCEDURE — 99233 SBSQ HOSP IP/OBS HIGH 50: CPT

## 2020-01-01 PROCEDURE — 70450 CT HEAD/BRAIN W/O DYE: CPT | Mod: 26

## 2020-01-01 PROCEDURE — 71045 X-RAY EXAM CHEST 1 VIEW: CPT | Mod: 26

## 2020-01-01 PROCEDURE — 74176 CT ABD & PELVIS W/O CONTRAST: CPT | Mod: 26

## 2020-01-01 PROCEDURE — 93306 TTE W/DOPPLER COMPLETE: CPT | Mod: 26

## 2020-01-01 PROCEDURE — 72125 CT NECK SPINE W/O DYE: CPT | Mod: 26

## 2020-01-01 PROCEDURE — 27245 TREAT THIGH FRACTURE: CPT | Mod: RT

## 2020-01-01 PROCEDURE — 99223 1ST HOSP IP/OBS HIGH 75: CPT

## 2020-01-01 PROCEDURE — 73560 X-RAY EXAM OF KNEE 1 OR 2: CPT | Mod: 26,RT

## 2020-01-01 PROCEDURE — 73502 X-RAY EXAM HIP UNI 2-3 VIEWS: CPT | Mod: 26,76,RT

## 2020-01-01 PROCEDURE — 71045 X-RAY EXAM CHEST 1 VIEW: CPT | Mod: 26,77

## 2020-01-01 PROCEDURE — 73552 X-RAY EXAM OF FEMUR 2/>: CPT | Mod: 26,RT

## 2020-01-01 RX ORDER — ACETAMINOPHEN 500 MG
1000 TABLET ORAL ONCE
Refills: 0 | Status: DISCONTINUED | OUTPATIENT
Start: 2020-05-01 | End: 2020-01-01

## 2020-01-01 RX ORDER — OXYCODONE HYDROCHLORIDE 5 MG/1
5 TABLET ORAL EVERY 4 HOURS
Refills: 0 | Status: DISCONTINUED | OUTPATIENT
Start: 2020-01-01 | End: 2020-01-01

## 2020-01-01 RX ORDER — DILTIAZEM HCL 120 MG
10 CAPSULE, EXT RELEASE 24 HR ORAL ONCE
Refills: 0 | Status: COMPLETED | OUTPATIENT
Start: 2020-01-01 | End: 2020-01-01

## 2020-01-01 RX ORDER — INSULIN LISPRO 100/ML
VIAL (ML) SUBCUTANEOUS EVERY 6 HOURS
Refills: 0 | Status: DISCONTINUED | OUTPATIENT
Start: 2020-01-01 | End: 2020-01-01

## 2020-01-01 RX ORDER — ACETAMINOPHEN 500 MG
1000 TABLET ORAL ONCE
Refills: 0 | Status: COMPLETED | OUTPATIENT
Start: 2020-01-01 | End: 2020-01-01

## 2020-01-01 RX ORDER — DEXTROSE 50 % IN WATER 50 %
25 SYRINGE (ML) INTRAVENOUS ONCE
Refills: 0 | Status: DISCONTINUED | OUTPATIENT
Start: 2020-01-01 | End: 2020-01-01

## 2020-01-01 RX ORDER — SODIUM CHLORIDE 9 MG/ML
1000 INJECTION, SOLUTION INTRAVENOUS
Refills: 0 | Status: DISCONTINUED | OUTPATIENT
Start: 2020-01-01 | End: 2020-01-01

## 2020-01-01 RX ORDER — ASPIRIN/CALCIUM CARB/MAGNESIUM 324 MG
325 TABLET ORAL DAILY
Refills: 0 | Status: DISCONTINUED | OUTPATIENT
Start: 2020-01-01 | End: 2020-01-01

## 2020-01-01 RX ORDER — MAGNESIUM SULFATE 500 MG/ML
1 VIAL (ML) INJECTION ONCE
Refills: 0 | Status: DISCONTINUED | OUTPATIENT
Start: 2020-01-01 | End: 2020-01-01

## 2020-01-01 RX ORDER — DILTIAZEM HCL 120 MG
7.5 CAPSULE, EXT RELEASE 24 HR ORAL
Qty: 125 | Refills: 0 | Status: DISCONTINUED | OUTPATIENT
Start: 2020-01-01 | End: 2020-01-01

## 2020-01-01 RX ORDER — ENOXAPARIN SODIUM 100 MG/ML
40 INJECTION SUBCUTANEOUS DAILY
Refills: 0 | Status: DISCONTINUED | OUTPATIENT
Start: 2020-01-01 | End: 2020-01-01

## 2020-01-01 RX ORDER — CLOPIDOGREL BISULFATE 75 MG/1
1 TABLET, FILM COATED ORAL
Qty: 0 | Refills: 0 | DISCHARGE

## 2020-01-01 RX ORDER — OXYCODONE HYDROCHLORIDE 5 MG/1
5 TABLET ORAL EVERY 6 HOURS
Refills: 0 | Status: DISCONTINUED | OUTPATIENT
Start: 2020-01-01 | End: 2020-01-01

## 2020-01-01 RX ORDER — FOSINOPRIL SODIUM 10 MG/1
1 TABLET ORAL
Qty: 0 | Refills: 0 | DISCHARGE

## 2020-01-01 RX ORDER — METOPROLOL TARTRATE 50 MG
5 TABLET ORAL ONCE
Refills: 0 | Status: COMPLETED | OUTPATIENT
Start: 2020-01-01 | End: 2020-01-01

## 2020-01-01 RX ORDER — POTASSIUM CHLORIDE 20 MEQ
10 PACKET (EA) ORAL
Refills: 0 | Status: COMPLETED | OUTPATIENT
Start: 2020-01-01 | End: 2020-01-01

## 2020-01-01 RX ORDER — MAGNESIUM SULFATE 500 MG/ML
4 VIAL (ML) INJECTION
Refills: 0 | Status: DISCONTINUED | OUTPATIENT
Start: 2020-01-01 | End: 2020-01-01

## 2020-01-01 RX ORDER — LISINOPRIL 2.5 MG/1
40 TABLET ORAL DAILY
Refills: 0 | Status: DISCONTINUED | OUTPATIENT
Start: 2020-01-01 | End: 2020-01-01

## 2020-01-01 RX ORDER — ACETAMINOPHEN 500 MG
1000 TABLET ORAL ONCE
Refills: 0 | Status: DISCONTINUED | OUTPATIENT
Start: 2020-01-01 | End: 2020-01-01

## 2020-01-01 RX ORDER — ACETAMINOPHEN 500 MG
1000 TABLET ORAL EVERY 8 HOURS
Refills: 0 | Status: DISCONTINUED | OUTPATIENT
Start: 2020-01-01 | End: 2020-01-01

## 2020-01-01 RX ORDER — DILTIAZEM HCL 120 MG
60 CAPSULE, EXT RELEASE 24 HR ORAL EVERY 6 HOURS
Refills: 0 | Status: DISCONTINUED | OUTPATIENT
Start: 2020-01-01 | End: 2020-01-01

## 2020-01-01 RX ORDER — ASPIRIN/CALCIUM CARB/MAGNESIUM 324 MG
300 TABLET ORAL DAILY
Refills: 0 | Status: DISCONTINUED | OUTPATIENT
Start: 2020-01-01 | End: 2020-01-01

## 2020-01-01 RX ORDER — MORPHINE SULFATE 50 MG/1
2 CAPSULE, EXTENDED RELEASE ORAL EVERY 4 HOURS
Refills: 0 | Status: DISCONTINUED | OUTPATIENT
Start: 2020-01-01 | End: 2020-01-01

## 2020-01-01 RX ORDER — PIPERACILLIN AND TAZOBACTAM 4; .5 G/20ML; G/20ML
3.38 INJECTION, POWDER, LYOPHILIZED, FOR SOLUTION INTRAVENOUS EVERY 8 HOURS
Refills: 0 | Status: DISCONTINUED | OUTPATIENT
Start: 2020-01-01 | End: 2020-01-01

## 2020-01-01 RX ORDER — SODIUM CHLORIDE 9 MG/ML
1000 INJECTION INTRAMUSCULAR; INTRAVENOUS; SUBCUTANEOUS
Refills: 0 | Status: DISCONTINUED | OUTPATIENT
Start: 2020-01-01 | End: 2020-01-01

## 2020-01-01 RX ORDER — SCOPALAMINE 1 MG/3D
1 PATCH, EXTENDED RELEASE TRANSDERMAL
Refills: 0 | Status: DISCONTINUED | OUTPATIENT
Start: 2020-01-01 | End: 2020-01-01

## 2020-01-01 RX ORDER — METFORMIN HYDROCHLORIDE 850 MG/1
1 TABLET ORAL
Qty: 0 | Refills: 0 | DISCHARGE

## 2020-01-01 RX ORDER — CEFAZOLIN SODIUM 1 G
2000 VIAL (EA) INJECTION EVERY 8 HOURS
Refills: 0 | Status: COMPLETED | OUTPATIENT
Start: 2020-01-01 | End: 2020-01-01

## 2020-01-01 RX ORDER — ATORVASTATIN CALCIUM 80 MG/1
80 TABLET, FILM COATED ORAL AT BEDTIME
Refills: 0 | Status: DISCONTINUED | OUTPATIENT
Start: 2020-01-01 | End: 2020-01-01

## 2020-01-01 RX ORDER — ASPIRIN/CALCIUM CARB/MAGNESIUM 324 MG
81 TABLET ORAL DAILY
Refills: 0 | Status: DISCONTINUED | OUTPATIENT
Start: 2020-01-01 | End: 2020-01-01

## 2020-01-01 RX ORDER — INSULIN LISPRO 100/ML
VIAL (ML) SUBCUTANEOUS
Refills: 0 | Status: DISCONTINUED | OUTPATIENT
Start: 2020-01-01 | End: 2020-01-01

## 2020-01-01 RX ORDER — ACETAMINOPHEN 500 MG
850 TABLET ORAL EVERY 8 HOURS
Refills: 0 | Status: DISCONTINUED | OUTPATIENT
Start: 2020-01-01 | End: 2020-01-01

## 2020-01-01 RX ORDER — FAMOTIDINE 10 MG/ML
20 INJECTION INTRAVENOUS
Refills: 0 | Status: DISCONTINUED | OUTPATIENT
Start: 2020-01-01 | End: 2020-01-01

## 2020-01-01 RX ORDER — DEXTROSE 50 % IN WATER 50 %
12.5 SYRINGE (ML) INTRAVENOUS ONCE
Refills: 0 | Status: DISCONTINUED | OUTPATIENT
Start: 2020-01-01 | End: 2020-01-01

## 2020-01-01 RX ORDER — POTASSIUM CHLORIDE 20 MEQ
40 PACKET (EA) ORAL EVERY 4 HOURS
Refills: 0 | Status: COMPLETED | OUTPATIENT
Start: 2020-01-01 | End: 2020-01-01

## 2020-01-01 RX ORDER — APIXABAN 2.5 MG/1
2.5 TABLET, FILM COATED ORAL EVERY 12 HOURS
Refills: 0 | Status: DISCONTINUED | OUTPATIENT
Start: 2020-01-01 | End: 2020-01-01

## 2020-01-01 RX ORDER — DILTIAZEM HCL 120 MG
30 CAPSULE, EXT RELEASE 24 HR ORAL THREE TIMES A DAY
Refills: 0 | Status: DISCONTINUED | OUTPATIENT
Start: 2020-01-01 | End: 2020-01-01

## 2020-01-01 RX ORDER — OXYCODONE HYDROCHLORIDE 5 MG/1
2.5 TABLET ORAL EVERY 6 HOURS
Refills: 0 | Status: DISCONTINUED | OUTPATIENT
Start: 2020-01-01 | End: 2020-01-01

## 2020-01-01 RX ORDER — FAMOTIDINE 10 MG/ML
1 INJECTION INTRAVENOUS
Qty: 0 | Refills: 0 | DISCHARGE

## 2020-01-01 RX ORDER — GLUCAGON INJECTION, SOLUTION 0.5 MG/.1ML
1 INJECTION, SOLUTION SUBCUTANEOUS ONCE
Refills: 0 | Status: DISCONTINUED | OUTPATIENT
Start: 2020-01-01 | End: 2020-01-01

## 2020-01-01 RX ORDER — ACETAMINOPHEN 500 MG
975 TABLET ORAL EVERY 8 HOURS
Refills: 0 | Status: DISCONTINUED | OUTPATIENT
Start: 2020-01-01 | End: 2020-01-01

## 2020-01-01 RX ORDER — SENNA PLUS 8.6 MG/1
2 TABLET ORAL AT BEDTIME
Refills: 0 | Status: DISCONTINUED | OUTPATIENT
Start: 2020-01-01 | End: 2020-01-01

## 2020-01-01 RX ORDER — GLIMEPIRIDE 1 MG
1 TABLET ORAL
Qty: 0 | Refills: 0 | DISCHARGE

## 2020-01-01 RX ORDER — OXYCODONE HYDROCHLORIDE 5 MG/1
2.5 TABLET ORAL EVERY 4 HOURS
Refills: 0 | Status: DISCONTINUED | OUTPATIENT
Start: 2020-01-01 | End: 2020-01-01

## 2020-01-01 RX ORDER — VANCOMYCIN HCL 1 G
500 VIAL (EA) INTRAVENOUS EVERY 12 HOURS
Refills: 0 | Status: DISCONTINUED | OUTPATIENT
Start: 2020-01-01 | End: 2020-01-01

## 2020-01-01 RX ORDER — AMLODIPINE BESYLATE 2.5 MG/1
10 TABLET ORAL DAILY
Refills: 0 | Status: DISCONTINUED | OUTPATIENT
Start: 2020-01-01 | End: 2020-01-01

## 2020-01-01 RX ORDER — ASPIRIN/CALCIUM CARB/MAGNESIUM 324 MG
1 TABLET ORAL
Qty: 0 | Refills: 0 | DISCHARGE

## 2020-01-01 RX ORDER — INSULIN HUMAN 100 [IU]/ML
6 INJECTION, SOLUTION SUBCUTANEOUS EVERY 6 HOURS
Refills: 0 | Status: DISCONTINUED | OUTPATIENT
Start: 2020-01-01 | End: 2020-01-01

## 2020-01-01 RX ORDER — ENOXAPARIN SODIUM 100 MG/ML
40 INJECTION SUBCUTANEOUS ONCE
Refills: 0 | Status: COMPLETED | OUTPATIENT
Start: 2020-01-01 | End: 2020-01-01

## 2020-01-01 RX ORDER — DEXTROSE 50 % IN WATER 50 %
15 SYRINGE (ML) INTRAVENOUS ONCE
Refills: 0 | Status: DISCONTINUED | OUTPATIENT
Start: 2020-01-01 | End: 2020-01-01

## 2020-01-01 RX ORDER — METOPROLOL TARTRATE 50 MG
5 TABLET ORAL EVERY 6 HOURS
Refills: 0 | Status: DISCONTINUED | OUTPATIENT
Start: 2020-01-01 | End: 2020-01-01

## 2020-01-01 RX ORDER — AMLODIPINE BESYLATE 2.5 MG/1
1 TABLET ORAL
Qty: 0 | Refills: 0 | DISCHARGE

## 2020-01-01 RX ORDER — HEPARIN SODIUM 5000 [USP'U]/ML
INJECTION INTRAVENOUS; SUBCUTANEOUS
Qty: 25000 | Refills: 0 | Status: DISCONTINUED | OUTPATIENT
Start: 2020-01-01 | End: 2020-01-01

## 2020-01-01 RX ADMIN — Medication 100 MILLIEQUIVALENT(S): at 17:20

## 2020-01-01 RX ADMIN — INSULIN HUMAN 6 UNIT(S): 100 INJECTION, SOLUTION SUBCUTANEOUS at 19:04

## 2020-01-01 RX ADMIN — Medication 10 MILLIGRAM(S): at 08:01

## 2020-01-01 RX ADMIN — Medication 300 MILLIGRAM(S): at 12:11

## 2020-01-01 RX ADMIN — SODIUM CHLORIDE 50 MILLILITER(S): 9 INJECTION, SOLUTION INTRAVENOUS at 00:59

## 2020-01-01 RX ADMIN — SODIUM CHLORIDE 75 MILLILITER(S): 9 INJECTION, SOLUTION INTRAVENOUS at 15:44

## 2020-01-01 RX ADMIN — Medication 40 MILLIEQUIVALENT(S): at 18:30

## 2020-01-01 RX ADMIN — Medication 7.5 MG/HR: at 15:20

## 2020-01-01 RX ADMIN — Medication 5 MILLIGRAM(S): at 03:43

## 2020-01-01 RX ADMIN — Medication 5: at 18:18

## 2020-01-01 RX ADMIN — Medication 850 MILLIGRAM(S): at 05:43

## 2020-01-01 RX ADMIN — Medication 100 MILLIEQUIVALENT(S): at 12:45

## 2020-01-01 RX ADMIN — Medication 4: at 18:33

## 2020-01-01 RX ADMIN — Medication 5 MILLIGRAM(S): at 20:50

## 2020-01-01 RX ADMIN — ENOXAPARIN SODIUM 40 MILLIGRAM(S): 100 INJECTION SUBCUTANEOUS at 12:11

## 2020-01-01 RX ADMIN — Medication 100 MILLIGRAM(S): at 06:04

## 2020-01-01 RX ADMIN — Medication 2: at 04:27

## 2020-01-01 RX ADMIN — Medication 4: at 07:59

## 2020-01-01 RX ADMIN — Medication 300 MILLIGRAM(S): at 14:25

## 2020-01-01 RX ADMIN — Medication 40 MILLIEQUIVALENT(S): at 11:59

## 2020-01-01 RX ADMIN — Medication 7.5 MG/HR: at 23:51

## 2020-01-01 RX ADMIN — Medication 100 MILLIEQUIVALENT(S): at 17:58

## 2020-01-01 RX ADMIN — SENNA PLUS 2 TABLET(S): 8.6 TABLET ORAL at 21:15

## 2020-01-01 RX ADMIN — ENOXAPARIN SODIUM 40 MILLIGRAM(S): 100 INJECTION SUBCUTANEOUS at 12:15

## 2020-01-01 RX ADMIN — SODIUM CHLORIDE 125 MILLILITER(S): 9 INJECTION INTRAMUSCULAR; INTRAVENOUS; SUBCUTANEOUS at 23:53

## 2020-01-01 RX ADMIN — Medication 100 MILLIEQUIVALENT(S): at 20:10

## 2020-01-01 RX ADMIN — Medication 2: at 12:08

## 2020-01-01 RX ADMIN — Medication 3: at 12:08

## 2020-01-01 RX ADMIN — FAMOTIDINE 20 MILLIGRAM(S): 10 INJECTION INTRAVENOUS at 04:16

## 2020-01-01 RX ADMIN — Medication 7.5 MG/HR: at 08:04

## 2020-01-01 RX ADMIN — Medication 3: at 00:01

## 2020-01-01 RX ADMIN — Medication 3: at 17:05

## 2020-01-01 RX ADMIN — SODIUM CHLORIDE 50 MILLILITER(S): 9 INJECTION, SOLUTION INTRAVENOUS at 02:28

## 2020-01-01 RX ADMIN — Medication 60 MILLIGRAM(S): at 23:55

## 2020-01-01 RX ADMIN — Medication 40 MILLIEQUIVALENT(S): at 15:41

## 2020-01-01 RX ADMIN — Medication 100 MILLIEQUIVALENT(S): at 10:40

## 2020-01-01 RX ADMIN — Medication 60 MILLIGRAM(S): at 17:25

## 2020-01-01 RX ADMIN — Medication 3: at 06:51

## 2020-01-01 RX ADMIN — Medication 2: at 23:45

## 2020-01-01 RX ADMIN — Medication 5 MILLIGRAM(S): at 14:23

## 2020-01-01 RX ADMIN — Medication 100 MILLIEQUIVALENT(S): at 12:15

## 2020-01-01 RX ADMIN — Medication 850 MILLIGRAM(S): at 15:43

## 2020-01-01 RX ADMIN — Medication 850 MILLIGRAM(S): at 23:30

## 2020-01-01 RX ADMIN — Medication 60 MILLIGRAM(S): at 23:31

## 2020-01-01 RX ADMIN — Medication 4: at 23:55

## 2020-01-01 RX ADMIN — Medication 975 MILLIGRAM(S): at 04:16

## 2020-01-01 RX ADMIN — Medication 3: at 00:04

## 2020-01-01 RX ADMIN — Medication 100 MILLIEQUIVALENT(S): at 09:44

## 2020-01-01 RX ADMIN — Medication 6: at 12:08

## 2020-01-01 RX ADMIN — Medication 100 GRAM(S): at 08:20

## 2020-01-01 RX ADMIN — SODIUM CHLORIDE 125 MILLILITER(S): 9 INJECTION INTRAMUSCULAR; INTRAVENOUS; SUBCUTANEOUS at 17:08

## 2020-01-01 RX ADMIN — ENOXAPARIN SODIUM 40 MILLIGRAM(S): 100 INJECTION SUBCUTANEOUS at 22:05

## 2020-01-01 RX ADMIN — Medication 4: at 00:42

## 2020-01-01 RX ADMIN — Medication 100 MILLIEQUIVALENT(S): at 10:02

## 2020-01-01 RX ADMIN — Medication 100 MILLIEQUIVALENT(S): at 18:35

## 2020-01-01 RX ADMIN — Medication 7.5 MG/HR: at 02:27

## 2020-01-01 RX ADMIN — Medication 7.5 MG/HR: at 00:59

## 2020-01-01 RX ADMIN — Medication 100 MILLIEQUIVALENT(S): at 12:09

## 2020-01-01 RX ADMIN — Medication 60 MILLIGRAM(S): at 16:57

## 2020-01-01 RX ADMIN — Medication 300 MILLIGRAM(S): at 09:38

## 2020-01-01 RX ADMIN — Medication 325 MILLIGRAM(S): at 12:09

## 2020-01-01 RX ADMIN — Medication 100 MILLIGRAM(S): at 23:54

## 2020-01-01 RX ADMIN — SODIUM CHLORIDE 50 MILLILITER(S): 9 INJECTION, SOLUTION INTRAVENOUS at 23:30

## 2020-01-01 RX ADMIN — SODIUM CHLORIDE 50 MILLILITER(S): 9 INJECTION, SOLUTION INTRAVENOUS at 17:06

## 2020-01-01 RX ADMIN — Medication 5: at 12:40

## 2020-01-01 RX ADMIN — Medication 100 MILLIEQUIVALENT(S): at 09:20

## 2020-01-01 RX ADMIN — Medication 5 MILLIGRAM(S): at 06:07

## 2020-01-01 RX ADMIN — Medication 7.5 MG/HR: at 19:03

## 2020-01-01 RX ADMIN — Medication 4: at 06:36

## 2020-01-01 RX ADMIN — Medication 3: at 06:15

## 2020-01-01 RX ADMIN — Medication 7.5 MG/HR: at 08:45

## 2020-01-01 RX ADMIN — SODIUM CHLORIDE 125 MILLILITER(S): 9 INJECTION INTRAMUSCULAR; INTRAVENOUS; SUBCUTANEOUS at 15:31

## 2020-01-01 RX ADMIN — SODIUM CHLORIDE 50 MILLILITER(S): 9 INJECTION, SOLUTION INTRAVENOUS at 08:21

## 2020-01-01 RX ADMIN — SODIUM CHLORIDE 125 MILLILITER(S): 9 INJECTION INTRAMUSCULAR; INTRAVENOUS; SUBCUTANEOUS at 08:04

## 2020-01-01 RX ADMIN — SODIUM CHLORIDE 50 MILLILITER(S): 9 INJECTION, SOLUTION INTRAVENOUS at 10:41

## 2020-01-01 RX ADMIN — HEPARIN SODIUM 1000 UNIT(S)/HR: 5000 INJECTION INTRAVENOUS; SUBCUTANEOUS at 08:03

## 2020-01-01 RX ADMIN — MORPHINE SULFATE 2 MILLIGRAM(S): 50 CAPSULE, EXTENDED RELEASE ORAL at 11:55

## 2020-01-01 RX ADMIN — Medication 10 MILLIGRAM(S): at 13:31

## 2020-01-01 RX ADMIN — Medication 4: at 00:56

## 2020-01-01 RX ADMIN — Medication 100 MILLIEQUIVALENT(S): at 17:21

## 2020-01-01 RX ADMIN — Medication 400 MILLIGRAM(S): at 23:55

## 2020-01-01 RX ADMIN — Medication 4: at 12:10

## 2020-01-01 RX ADMIN — Medication 3: at 12:17

## 2020-01-01 RX ADMIN — Medication 7.5 MG/HR: at 17:07

## 2020-01-01 RX ADMIN — Medication 5: at 06:47

## 2020-01-01 RX ADMIN — Medication 2: at 21:49

## 2020-01-01 RX ADMIN — Medication 400 MILLIGRAM(S): at 09:40

## 2020-01-01 RX ADMIN — Medication 3: at 12:19

## 2020-01-01 RX ADMIN — Medication 7.5 MG/HR: at 16:14

## 2020-01-01 RX ADMIN — Medication 60 MILLIGRAM(S): at 05:43

## 2020-01-01 RX ADMIN — Medication 100 MILLIEQUIVALENT(S): at 11:11

## 2020-01-01 RX ADMIN — Medication 2: at 17:29

## 2020-01-01 RX ADMIN — Medication 975 MILLIGRAM(S): at 21:15

## 2020-01-01 RX ADMIN — Medication 3: at 09:25

## 2020-01-01 RX ADMIN — OXYCODONE HYDROCHLORIDE 2.5 MILLIGRAM(S): 5 TABLET ORAL at 12:01

## 2020-01-01 RX ADMIN — Medication 2: at 18:03

## 2020-01-01 RX ADMIN — Medication 63.75 MILLIMOLE(S): at 06:47

## 2020-01-01 RX ADMIN — Medication 60 MILLIGRAM(S): at 12:02

## 2020-01-01 RX ADMIN — Medication 100 MILLIEQUIVALENT(S): at 11:44

## 2020-01-01 RX ADMIN — Medication 400 MILLIGRAM(S): at 23:42

## 2020-01-01 RX ADMIN — Medication 3: at 18:16

## 2020-01-01 RX ADMIN — SODIUM CHLORIDE 50 MILLILITER(S): 9 INJECTION, SOLUTION INTRAVENOUS at 14:25

## 2020-04-23 NOTE — CONSULT NOTE ADULT - ASSESSMENT
A 89 year old Irish-speaking female patient w/ PMH of CAD, HTN, HLD, CVA on Plavix, DM BIBEMS from home for worsening mental status and concern for R leg fracture s/p fall.

## 2020-04-23 NOTE — CONSULT NOTE ADULT - PROBLEM SELECTOR RECOMMENDATION 2
- Vitals: reviewed. -146/76, afebrile, -130s.   - Labs: reviewed, hyponatremia 130, hyperglycemia 300.   - EKG: to be reviewed.  - Imaging: reviewed, CXR did not show any acute pulmonary process.   - History of +DM, +CAD, +CVA, in the past. On ASA, Plavix.   - New Onset Atrial fibrillation. No prior history of anticoagulation use.  - Will need HR control with a Class IIb agent, d/w Cardiology.   - Revised Cardiac Risk Index 4:  15% 30 day risk of death MI or cardiac arrest.   - Poor functional METS <4.  - TTE will not likely , but will defer to Cardiology team.   - Patient is at elevated risk for an intermediate risk (ortho) procedure.  - Patient medically optimized for procedure if HR and glucose controlled.  - Would suggest Cardiology Clearance given Afib with RVR.    *PLEASE Contact IM/Hospitalist Service for any acute change in condition. - Vitals: reviewed. -146/76, afebrile, -130s.   - Labs: reviewed, hyponatremia 130, hyperglycemia 300.   - EKG: to be reviewed.  - Imaging: reviewed, CXR did not show any acute pulmonary process.   - History of +DM, +CAD, +CVA, in the past. On ASA, Plavix.   - New Onset Afib. CHADVasc >6. No prior h/o AC use.   - Will need HR control with a Class IIb agent, d/w Cardiology.   - Revised Cardiac Risk Index 4:  15% 30 day risk of death MI or cardiac arrest.   - Poor functional METS <4.  - TTE will not likely , but will defer to Cardiology team.   - Patient is at elevated risk for an intermediate risk (ortho) procedure.  - Patient medically optimized for procedure if HR and glucose controlled.  - Would suggest Cardiology Clearance given Afib with RVR.    *PLEASE Contact IM/Hospitalist Service for any acute change in condition. - Vitals: reviewed. -146/76, afebrile, -130s.   - Labs: reviewed, hyponatremia 130, hyperglycemia 300.   - EKG: to be reviewed.  - Imaging: reviewed, CXR did not show any acute pulmonary process.   - History of +DM, +CAD, +CVA, in the past. On ASA, Plavix.   - New Onset Afib. CHADVasc >6. No prior h/o AC use.   - Will need HR control with a Class IIb agent, d/w Cardiology.   - TTE will not likely , but will defer to Cardiology team.   - Revised Cardiac Risk Index 4:  15% 30 day risk of death MI or cardiac arrest.   - Poor functional METS <4.  - Patient is at elevated risk for an intermediate risk (ortho) procedure.  - Patient medically optimized for procedure if HR and glucose controlled.  - Would suggest Cardiology Clearance given Afib with RVR.    *PLEASE Contact IM/Hospitalist Service for any acute change in condition. - Vitals: reviewed. -146/76, afebrile, -130s.   - Labs: reviewed, hyponatremia 130, hyperglycemia 300.   - EKG: reviewed.   - Imaging: reviewed, CXR did not show any acute pulmonary process.   - History of +DM, +CAD, +CVA, in the past. On ASA, Plavix.   - New Onset Afib. CHADVasc >6. No prior h/o AC use.   - Will need HR control with a Class IIb agent, d/w Cardiology.   - TTE will not likely , but will defer to Cardiology team.   - Revised Cardiac Risk Index 4:  15% 30 day risk of death MI or cardiac arrest.   - Poor functional METS <4.  - Patient is at elevated risk for an intermediate risk (ortho) procedure.  - Patient medically optimized for procedure if HR and glucose controlled.  - Would suggest Cardiology Clearance given Afib with RVR.    *PLEASE Contact IM/Hospitalist Service for any acute change in condition.

## 2020-04-23 NOTE — ED ADULT TRIAGE NOTE - CHIEF COMPLAINT QUOTE
Pt arrives via EMS--pts family called because pt fell on saturday (on plavix)--Pt has rt hip shorting and outward rotation of rt leg--pts mental status has decreased from baseline of dementia.. Pt responds to pain--  Pts fs 342

## 2020-04-23 NOTE — H&P ADULT - NSICDXPASTMEDICALHX_GEN_ALL_CORE_FT
PAST MEDICAL HISTORY:  CAD (coronary artery disease)     CVA (cerebral vascular accident)     Diabetes     Hyperlipemia     Hypertension

## 2020-04-23 NOTE — ED ADULT NURSE NOTE - OBJECTIVE STATEMENT
Pt brought by EMS with report of a fall last Saturday.  States pt has hx of dementia, presents nonverbal, reported that pt normally speaks short sentences.  Pt's primary language [place] citizen.  Pt observed to be in A Fib with a rate between 120-130.  Pt moves left arm and leg, no movement noted to right side.  EMS reports pt has hx of mini strokes with unknown deficits.  Pt does make eye contact, lifted left leg to assist clothing removal.  Cooperated with oral temperature, closing mouth around probe.  Right leg appears shortened and externally rotated.  Pt shows no s/s of grimacing, or guarding when leg manipulated to remove clothing.  EMS line placed RAC, new line placed LAC 20 G.  Labs collected.

## 2020-04-23 NOTE — CONSULT NOTE ADULT - SUBJECTIVE AND OBJECTIVE BOX
CHIEF COMPLAINT:Patient is a 89y old  Female who presents with a chief complaint of R hip fx (23 Apr 2020 14:58)      HISTORY OF PRESENT ILLNESS:    89 female with history as below, HTN , CVA in the past , DM type II admitted s/p [facility] fall now with right hip fx   no LOC as per family   Due to altered mental status, subjective information were not able to be obtained from the patient. History was obtained, to the extent possible, from review of the chart and collateral sources of information.     PAST MEDICAL & SURGICAL HISTORY:  Hypertension  CVA (cerebral vascular accident)  Hyperlipemia  CAD (coronary artery disease)  Diabetes          MEDICATIONS:  diltiazem Infusion 7.5 mG/Hr IV Continuous <Continuous>  enoxaparin Injectable 40 milliGRAM(s) SubCutaneous once  propranolol 10 milliGRAM(s) Oral three times a day        acetaminophen   Tablet .. 975 milliGRAM(s) Oral every 8 hours  oxyCODONE    IR 2.5 milliGRAM(s) Oral every 4 hours PRN  oxyCODONE    IR 5 milliGRAM(s) Oral every 4 hours PRN    famotidine    Tablet 20 milliGRAM(s) Oral two times a day  senna 2 Tablet(s) Oral at bedtime      sodium chloride 0.9%. 1000 milliLiter(s) IV Continuous <Continuous>      FAMILY HISTORY:      Non-contributory    SOCIAL HISTORY:    No tobacco, drugs or etoh    Allergies    No Known Allergies    Intolerances    	    REVIEW OF SYSTEMS:  as above  The rest of the 14 points ROS reviewed and except above they are unremarkable.        PHYSICAL EXAM:  T(C): 36.6 (04-23-20 @ 13:53), Max: 36.7 (04-23-20 @ 10:46)  HR: 107 (04-23-20 @ 13:53) (97 - 107)  BP: 146/76 (04-23-20 @ 13:53) (126/74 - 146/76)  RR: 24 (04-23-20 @ 13:53) (18 - 24)  SpO2: 100% (04-23-20 @ 13:53) (97% - 100%)  Wt(kg): --  I&O's Summary    JVP: Normal  Neck: supple  Lung: clear   CV: S1 S2 , Murmur:  Abd: soft  Ext: No edema  neuro: Awake   Psych: flat affect  Skin: normal      LABS/DATA:    TELEMETRY: 	  afib with rvr   ECG:  	MAT non specific st wave changes   	  CARDIAC MARKERS:                                      11.6   10.64 )-----------( 290      ( 23 Apr 2020 11:45 )             35.3     04-23    133<L>  |  99  |  29<H>  ----------------------------<  335<H>  3.6   |  19<L>  |  0.73    Ca    9.0      23 Apr 2020 11:45    TPro  7.0  /  Alb  3.8  /  TBili  0.7  /  DBili  x   /  AST  14  /  ALT  13  /  AlkPhos  100  04-23    proBNP:   Lipid Profile:   HgA1c:   TSH:

## 2020-04-23 NOTE — ED PROVIDER NOTE - PMH
CAD (coronary artery disease)    CVA (cerebral vascular accident)    Diabetes    Hyperlipemia    Hypertension

## 2020-04-23 NOTE — ED PROVIDER NOTE - PROGRESS NOTE DETAILS
EKG w/ findings of Afib w/ RVR (135), will give diltiazem 10mg for rate control, keep on cardiac monitor. Ortho consulted for R intertrochanteric fx seen on xray.   Erlinda Eid D.O. (PGY-1) Contacted daughter again in regards to R hip fx findings and pt requiring admission. Ortho accepted the patient. Will start lovenox in ED for Afib. Covid swab sent.   Erlinda Eid D.O. (PGY-1)

## 2020-04-23 NOTE — CONSULT NOTE ADULT - PROBLEM SELECTOR RECOMMENDATION 9
- Continue plan/management per Orthopedic Surgery.   - Ensure adequate pain control.   - Plan for Right ORIF in AM (4/24/2020).   - See PRE-OPERATIVE/MEDICAL CLEARANCE NOTE below. - Continue plan/management per Orthopedic Surgery.   - Ensure adequate pain control.   - Plan for Right ORIF in AM (4/24/2020).   - See PRE-OPERATIVE/MEDICAL CLEARANCE NOTE below.  - Medicine will continue to follow.

## 2020-04-23 NOTE — CONSULT NOTE ADULT - PROBLEM SELECTOR RECOMMENDATION 3
- On admission , found to be in Afib with RVR - ?New Onset.   - S/p IV Cardizem 10mg IVP. Started on Cardizem Drip, Propanolol.   - Cardiology CONSULT.  - Telemetry Monitoring.  - Monitor electrolytes, correct/replete. f/u Mg/ Phosphate. - On admission , found to be in Afib with RVR - ?New Onset.   - CHADVasc >6, no prior history of AC use.   - May benefit from long term AC after risks vs benefit convo with pt/family.   - S/p IV Cardizem 10mg IVP. Started on Cardizem Drip, Propanolol.   - Cardiology CONSULTED.  - Telemetry Monitoring, Monitor electrolytes, f/u Mg/ Phosphate. - On admission , found to be in Afib with RVR - ?New Onset.   - CHADVasc >6, no prior history of AC use.   - May benefit from long term AC (stroke prevention) discuss risks/benefit w/ family.   - S/p IV Cardizem 10mg IVP. Started on Cardizem Drip, Propanolol.   - Cardiology CONSULTED.  - Telemetry Monitoring, Monitor electrolytes, f/u Mg/ Phosphate.

## 2020-04-23 NOTE — ED PROVIDER NOTE - OBJECTIVE STATEMENT
89y French-speaking F w/ PMHx CAD, HTN, HLD, CVA on Plavix, DM BIBEMS from home for worsening mental status and concern for R leg fx s/p fall. As per EMS, patient fell from standing positing Saturday, landing on her R side w/ +head trauma, unknown LOC. Family did not take patient to ED at time of fall, decided to observe her for fear of exposing her to COVID. As per daughter ([phone]) patient's mental status has progressively worsened since fall, now completely non-verbal and non-ambulatory. Daughter states pt's baseline mental status involved limited words, can recognize family members, usually ambulates at home w/ cane prior to fall. Daughter denies known fever, cough, n/v/d. 89y Sudanese-speaking F w/ PMHx CAD, HTN, HLD, CVA on Plavix, DM BIBEMS from home for worsening mental status and concern for R leg fx s/p fall. As per EMS, patient fell from standing positing Saturday, landing on her R side w/ +head trauma, unknown LOC. Family did not take patient to ED at time of fall, decided to observe her for fear of exposing her to COVID. As per daughter Mervat ([phone]) patient's mental status has progressively worsened since fall, now completely non-verbal and non-ambulatory. Daughter states pt's baseline mental status involved limited words, can recognize family members, usually ambulates at home w/ cane prior to fall. Daughter denies known fever, cough, n/v/d.

## 2020-04-23 NOTE — CONSULT NOTE ADULT - ASSESSMENT
MAT  AFIB with RVR  start cardizem infusion   start low dose BB as ordered   The calculated [id number] score is 7 , high risk for future cva, recommend low dose eliquis post op if no objection     HTN  DC lisinopril and norvasc given starting above meds     DM type II  Monitor finger stick. Insulin coverage. Diabetic education and Diabetic diet. Consider nutrition consultation.  check hga1c    history of cva  likely due to previous afib  recommend low dose eliquis post op   off antiplt therapy     rule out COVID19    .PreOp  Based on current ACC/AHA guidelines, patient history and physical exam, the patient is considered to have high risk   pending normalization of HR   obtain echo if possible but not absolutely needed if HR is adequate given urgency of her condition

## 2020-04-23 NOTE — ED PROVIDER NOTE - PHYSICAL EXAMINATION
Physical Exam:  Gen: NAD, awake alert, non-verbal, making eye contact   Head: NCAT  Neck: Passive ROM limited, neck stiff, patient prefers head turned toward left side of body   HEENT: PEERL, normal conjunctiva, oral mucosa moist  Lung: CTAB, no respiratory distress, no wheezes/rhonchi/rales B/L  CV: Rapid rate, irregular rhythm, no murmurs, rubs or gallops  Abd: soft, NT, ND, no guarding, no rigidity, no rebound tenderness  MSK: RLE externally rotated and shortened compared to LLE. +Active spontaneous movement of LLE and B/L UE, not moving RLE  Neuro: Does not follow commands, actively ranges b/l UE and LLE spontaneously, tracks with eyes  Skin: Warm, well perfused, +extensive ecchymosis of posteromedial R leg  Psych: calm  ~Erlinda Eid D.O. -Resident

## 2020-04-23 NOTE — H&P ADULT - HISTORY OF PRESENT ILLNESS
89y Female PMHx dementia, HTN, CVA (on aspirin, Plavix), HLD, CAD, DM presents s/p mech fall on Saturday c/o severe R hip pain and inability to ambulate.  Patient's daughter endorses head strike but no LOC. Patient denies numbness/tingling/burning in the RLE. No other bone/joint complaints. Patient is a community ambulator at baseline with assistive devices. Patient was in Afib RVR  in ED.    PAST MEDICAL & SURGICAL HISTORY:  Hypertension  CVA (cerebral vascular accident)  Hyperlipemia  CAD (coronary artery disease)  Diabetes    MEDICATIONS  (STANDING):  acetaminophen   Tablet .. 975 milliGRAM(s) Oral every 8 hours  amLODIPine   Tablet 10 milliGRAM(s) Oral daily  famotidine    Tablet 20 milliGRAM(s) Oral two times a day  lisinopril 40 milliGRAM(s) Oral daily  senna 2 Tablet(s) Oral at bedtime  sodium chloride 0.9%. 1000 milliLiter(s) (125 mL/Hr) IV Continuous <Continuous>    MEDICATIONS  (PRN):  oxyCODONE    IR 2.5 milliGRAM(s) Oral every 4 hours PRN Moderate Pain (4 - 6)  oxyCODONE    IR 5 milliGRAM(s) Oral every 4 hours PRN Severe Pain (7 - 10)    Allergies    No Known Allergies    Intolerances                              11.6   10.64 )-----------( 290      ( 23 Apr 2020 11:45 )             35.3     04-23    133<L>  |  99  |  29<H>  ----------------------------<  335<H>  3.6   |  19<L>  |  0.73    Ca    9.0      23 Apr 2020 11:45    TPro  7.0  /  Alb  3.8  /  TBili  0.7  /  DBili  x   /  AST  14  /  ALT  13  /  AlkPhos  100  04-23    PT/INR - ( 23 Apr 2020 11:45 )   PT: 11.7 SEC;   INR: 1.03          PTT - ( 23 Apr 2020 11:45 )  PTT:24.1 SEC    T(C): 36.6 (04-23-20 @ 13:53), Max: 36.7 (04-23-20 @ 10:46)  HR: 107 (04-23-20 @ 13:53) (97 - 107)  BP: 146/76 (04-23-20 @ 13:53) (126/74 - 146/76)  RR: 24 (04-23-20 @ 13:53) (18 - 24)  SpO2: 100% (04-23-20 @ 13:53) (97% - 100%)  Wt(kg): --    PE   RLE:  Skin intact; No ecchymosis/soft tissue swelling  Compartments soft; + TTP about hip. No TTP to knee/leg/ankle/foot   ROM lmited 2/2 pain   Unable to SLR; + Log Roll/Heel Strike  Motor intact GS/TA/FHL/EHL  SILT L2-S1  DP/PT pulses 2+    LLE/BUE:   No bony TTP; Good ROM w/o pain; Exam Unremarkable    Imaging:  XR demonstrating R intertrochanteric fracture    89y Female with R intertroch fracture  - Tele monitoring  - Pain control  - NPO/IVF at midnight  - CBC/BMP/Coags/UA/T+S x2  - EKG/CXR  - Medical clearance  - Plan for OR for ORIF in AM

## 2020-04-23 NOTE — CONSULT NOTE ADULT - SUBJECTIVE AND OBJECTIVE BOX
HPI: A 89 year old Ghanaian-speaking female patient w/ PMH of CAD, HTN, HLD, CVA on Plavix/ASA, DM, and BIBEMS from home for worsening mental status and concern for right leg fracture s/p mechanical fall now inability to ambulate. As per EMS, patient fell from standing position on Saturday, landing on her right side w/ +head trauma but no LOC per daughter. Patient denies numbness/tingling/burning in the RLE. No other bone/joint complaints. Family did not take patient to ED at time of fall, decided to observe her for fear of exposing her to COVID. As per daughter Mervat ([phone]) patient's mental status has progressively worsened since fall, now completely non-verbal and non-ambulatory. Daughter states pt's baseline mental status involved limited words, can recognize family members, usually ambulates at home w/ cane prior to fall. Daughter denies known fever, cough, n/v/d. Patient was in Afib RVR  in ED.      Extended Triage:    Vital Signs:  · BP Systolic	126 mm Hg	  · BP Diastolic	74 mm Hg	  · Heart Rate	97 /min	  · Respiration Rate (breaths/min)	18 /min	  · Temp (F)	98 Degrees F	  · Temp (C)	36.7 Degrees C	  · SpO2 (%)	97 %	    PAST MEDICAL & SURGICAL HISTORY:  Hypertension  CVA (cerebral vascular accident)  Hyperlipemia  CAD (coronary artery disease)  Diabetes    ROS:     ALLERGIES:  No Known Allergies  Intolerances    SOCIAL HISTORY:     FAMILY HISTORY:    MEDICATIONS  (STANDING):  acetaminophen   Tablet .. 975 milliGRAM(s) Oral every 8 hours  amLODIPine   Tablet 10 milliGRAM(s) Oral daily  famotidine    Tablet 20 milliGRAM(s) Oral two times a day  lisinopril 40 milliGRAM(s) Oral daily  senna 2 Tablet(s) Oral at bedtime  sodium chloride 0.9%. 1000 milliLiter(s) (125 mL/Hr) IV Continuous <Continuous>    MEDICATIONS  (PRN):  oxyCODONE    IR 2.5 milliGRAM(s) Oral every 4 hours PRN Moderate Pain (4 - 6)  oxyCODONE    IR 5 milliGRAM(s) Oral every 4 hours PRN Severe Pain (7 - 10)    Vital Signs Last 24 Hrs  T(C): 36.6 (23 Apr 2020 13:53), Max: 36.7 (23 Apr 2020 10:46)  T(F): 97.8 (23 Apr 2020 13:53), Max: 98 (23 Apr 2020 10:46)  HR: 107 (23 Apr 2020 13:53) (97 - 107)  BP: 146/76 (23 Apr 2020 13:53) (126/74 - 146/76)  BP(mean): --  RR: 24 (23 Apr 2020 13:53) (18 - 24)  SpO2: 100% (23 Apr 2020 13:53) (97% - 100%)  CAPILLARY BLOOD GLUCOSE    LABS: reviewed                         11.6   10.64 )-----------( 290      ( 23 Apr 2020 11:45 )             35.3     04-23    133<L>  |  99  |  29<H>  ----------------------------<  335<H>  3.6   |  19<L>  |  0.73    Ca    9.0      23 Apr 2020 11:45    TPro  7.0  /  Alb  3.8  /  TBili  0.7  /  DBili  x   /  AST  14  /  ALT  13  /  AlkPhos  100  04-23    PT/INR - ( 23 Apr 2020 11:45 )   PT: 11.7 SEC;   INR: 1.03        PTT - ( 23 Apr 2020 11:45 )  PTT:24.1 SEC    EKG(personally reviewed):    RADIOLOGY & ADDITIONAL TESTS:    Imaging Personally Reviewed:    < from: CT Cervical Spine No Cont (04.23.20 @ 12:39) >  Brain CT:  5mm axial sections of the brain were obtained from base to vertex, without the intravenous administration of contrast material. Coronal and sagittal computer generated reconstructed views are available.  No prior brain imaging is available for comparison.  The ventricles and sulci are prominent consistent age appropriate involutional changes. Extensive small white matter ischemic changes are noted. There is an old right parietal infarct. There is no hemorrhage, mass, or shift of midline structures. Bone window examination is unremarkable.  IMPRESSION: Age-appropriate of involutional and ischemic gliotic changes. No hemorrhage.   Cervical spine CT:  Serial thin sections on a multi slice scanner were obtained through the cervical spine from the C1 to the T3 level in a stacked axial fashion reformatted at 1.25 mm sections with sagittal and coronal computer generated reconstructed views.  There is generalized osteopenia. Moderate degenerative changes are identified with disc space narrowing at C3-4 through C6-7 T1. There is mild degenerative anterolisthesis at T1 to. No acute fractures or dislocations are identified. Hypertrophic calcified transverse ligament is identified at the craniocervical junction.  IMPRESSION: No acute fractures or dislocations. Moderate degenerative changes.    < from: Xray Femur 2 Views, Right (04.23.20 @ 12:24) >  EXAM:  RAD PELVIS AP ONLY    EXAM:  XR HIP 1V RT    EXAM:  XR FEMUR 2 VIEWS RT    EXAM:  RAD KNEE 1 OR 2 VIEWS RIGHT    PROCEDURE DATE:  Apr 23 2020   INTERPRETATION:  CLINICAL INDICATION: right lower extremity pain  EXAM:  AP pelvis right hip and femur and frontal and lateral right knee from 4/23/2020 at 1224. No similar prior studies available for comparison.  IMPRESSION:  Acute varus angulated proximal right femoral intertrochanteric fracture with lesser trochanteric avulsion. No dislocations or additional fractures in the remaining imaged regions.  Intact pelvic and obturator rings and symmetrically aligned and spaced SI joints and pubic symphysis.  Preserved bilateral hip joint spaces. Right knee medial tibiofemoral compartment predominant osteoarthritic change.  Generalized osteopenia otherwise no discrete lytic or blastic lesions particularly around the fracture margins to suspect a pathologic etiology.  Vascular calcifications.    Consultant(s) Notes Reviewed:      Care Discussed with Consultants/Other Providers: HPI: A 89 year old Thai-speaking female patient w/ PMH of CAD, HTN, HLD, CVA on Plavix/ASA, DM, and BIBEMS from home for worsening mental status and concern for right leg fracture s/p mechanical fall now inability to ambulate. As per EMS, patient fell from standing position on Saturday, landing on her right side w/ +head trauma but no LOC per daughter. Patient denies numbness/tingling/burning in the RLE. No other bone/joint complaints. Family did not take patient to ED at time of fall, decided to observe her for fear of exposing her to COVID. As per daughter Mervat ([phone]) patient's mental status has progressively worsened since fall, now completely non-verbal and non-ambulatory. Daughter states pt's baseline mental status involved limited words, can recognize family members, usually ambulates at home w/ cane prior to fall. Daughter denies known fever, cough, n/v/d. Patient was in Afib RVR  in ED.      Extended Triage:    Vital Signs:  · BP Systolic	126 mm Hg	  · BP Diastolic	74 mm Hg	  · Heart Rate	97 /min	  · Respiration Rate (breaths/min)	18 /min	  · Temp (F)	98 Degrees F	  · Temp (C)	36.7 Degrees C	  · SpO2 (%)	97 %	    PAST MEDICAL & SURGICAL HISTORY:  Hypertension  CVA (cerebral vascular accident)  Hyperlipemia  CAD (coronary artery disease)  Diabetes    ROS: unable to attain due to patient's mental status.     ALLERGIES:  No Known Allergies  Intolerances    SOCIAL HISTORY: Lives at home with family, ambulates with assistive walking device at baseline.     FAMILY HISTORY: unable to attain due to patient's mental status.     MEDICATIONS  (STANDING):  acetaminophen   Tablet .. 975 milliGRAM(s) Oral every 8 hours  amLODIPine   Tablet 10 milliGRAM(s) Oral daily  famotidine    Tablet 20 milliGRAM(s) Oral two times a day  lisinopril 40 milliGRAM(s) Oral daily  senna 2 Tablet(s) Oral at bedtime  sodium chloride 0.9%. 1000 milliLiter(s) (125 mL/Hr) IV Continuous <Continuous>    MEDICATIONS  (PRN):  oxyCODONE    IR 2.5 milliGRAM(s) Oral every 4 hours PRN Moderate Pain (4 - 6)  oxyCODONE    IR 5 milliGRAM(s) Oral every 4 hours PRN Severe Pain (7 - 10)    Vital Signs Last 24 Hrs  T(C): 36.6 (23 Apr 2020 13:53), Max: 36.7 (23 Apr 2020 10:46)  T(F): 97.8 (23 Apr 2020 13:53), Max: 98 (23 Apr 2020 10:46)  HR: 107 (23 Apr 2020 13:53) (97 - 107)  BP: 146/76 (23 Apr 2020 13:53) (126/74 - 146/76)  BP(mean): --  RR: 24 (23 Apr 2020 13:53) (18 - 24)  SpO2: 100% (23 Apr 2020 13:53) (97% - 100%)  CAPILLARY BLOOD GLUCOSE    Gen: NAD, awake alert, non-verbal, maintaining eye contact  Neck: Passive ROM limited, neck stiff, patient head facing left side.   HEENT: NT/AC normal conjunctiva, oral mucosa moist, PERRLA  Lung: bilateral air entry, no respiratory distress, no wheezes/rhonchi/rales B/L  CV: Rapid rate, irregular rhythm, no murmurs, rubs or gallops  Abdomen: soft, nontender, nondistended, no guarding, no rigidity, no rebound tenderness  MSK: RLE externally rotated,. attempting to shift in the bed. Limited movement RLE.   Neuro: Does not follow commands, actively ranges b/l UE and LLE spontaneously, tracks with eyes  Skin: Warm, well perfused, +extensive ecchymosis of posteromedial R leg  Psych: calm, no agitation noted.     LABS: reviewed                         11.6   10.64 )-----------( 290      ( 23 Apr 2020 11:45 )             35.3     04-23    133<L>  |  99  |  29<H>  ----------------------------<  335<H>  3.6   |  19<L>  |  0.73    Ca    9.0      23 Apr 2020 11:45    TPro  7.0  /  Alb  3.8  /  TBili  0.7  /  DBili  x   /  AST  14  /  ALT  13  /  AlkPhos  100  04-23    PT/INR - ( 23 Apr 2020 11:45 )   PT: 11.7 SEC;   INR: 1.03        PTT - ( 23 Apr 2020 11:45 )  PTT:24.1 SEC    EKG(personally reviewed):    RADIOLOGY & ADDITIONAL TESTS:    Imaging Personally Reviewed:    < from: CT Cervical Spine No Cont (04.23.20 @ 12:39) >  Brain CT:  5mm axial sections of the brain were obtained from base to vertex, without the intravenous administration of contrast material. Coronal and sagittal computer generated reconstructed views are available.  No prior brain imaging is available for comparison.  The ventricles and sulci are prominent consistent age appropriate involutional changes. Extensive small white matter ischemic changes are noted. There is an old right parietal infarct. There is no hemorrhage, mass, or shift of midline structures. Bone window examination is unremarkable.  IMPRESSION: Age-appropriate of involutional and ischemic gliotic changes. No hemorrhage.   Cervical spine CT:  Serial thin sections on a multi slice scanner were obtained through the cervical spine from the C1 to the T3 level in a stacked axial fashion reformatted at 1.25 mm sections with sagittal and coronal computer generated reconstructed views.  There is generalized osteopenia. Moderate degenerative changes are identified with disc space narrowing at C3-4 through C6-7 T1. There is mild degenerative anterolisthesis at T1 to. No acute fractures or dislocations are identified. Hypertrophic calcified transverse ligament is identified at the craniocervical junction.  IMPRESSION: No acute fractures or dislocations. Moderate degenerative changes.    < from: Xray Femur 2 Views, Right (04.23.20 @ 12:24) >  EXAM:  RAD PELVIS AP ONLY    EXAM:  XR HIP 1V RT    EXAM:  XR FEMUR 2 VIEWS RT    EXAM:  RAD KNEE 1 OR 2 VIEWS RIGHT    PROCEDURE DATE:  Apr 23 2020   INTERPRETATION:  CLINICAL INDICATION: right lower extremity pain  EXAM:  AP pelvis right hip and femur and frontal and lateral right knee from 4/23/2020 at 1224. No similar prior studies available for comparison.  IMPRESSION:  Acute varus angulated proximal right femoral intertrochanteric fracture with lesser trochanteric avulsion. No dislocations or additional fractures in the remaining imaged regions.  Intact pelvic and obturator rings and symmetrically aligned and spaced SI joints and pubic symphysis.  Preserved bilateral hip joint spaces. Right knee medial tibiofemoral compartment predominant osteoarthritic change.  Generalized osteopenia otherwise no discrete lytic or blastic lesions particularly around the fracture margins to suspect a pathologic etiology.  Vascular calcifications.    Consultant(s) Notes Reviewed:      Care Discussed with Consultants/Other Providers:

## 2020-04-23 NOTE — ED PROVIDER NOTE - ATTENDING CONTRIBUTION TO CARE
agree with resident note    "89y Portuguese-speaking F w/ PMHx CAD, HTN, HLD, CVA on Plavix, DM BIBEMS from home for worsening mental status and concern for R leg fx s/p fall. As per EMS, patient fell from standing positing Saturday, landing on her R side w/ +head trauma, unknown LOC. Family did not take patient to ED at time of fall, decided to observe her for fear of exposing her to COVID. "      PE: VSS, protecting airway, rightward gaze?, CTAB/L; irregular irregular; abd soft/NT/ND ext: right hip externally rotated and shortened; vasc: 2+ DP pulses    IMp: Right IT fracture; CT head no acute pathology; ortho consult; admit

## 2020-04-23 NOTE — ED PROVIDER NOTE - CLINICAL SUMMARY MEDICAL DECISION MAKING FREE TEXT BOX
89y Setsw[place]-speaking F w/ PMHx CAD, HTN, HLD, CVA on Plavix, DM BIBEMS from home for worsening mental status and concern for R leg fx s/p fall. Non-verbal on arrival, VS WNL. Does not follow commands, RLE shortened and external rotated w/ severe ecchymosis, c/f R hip fx. Difficult to assess neuro exam, pt on Plavix w/ reported worsening mental status w/ obtain CT head/c-spine to r/o ICH. Pelvic/Hip xrays to assess for likely R hip fx, pre-op labs, CXR, reassess.

## 2020-04-23 NOTE — ED ADULT NURSE NOTE - NSIMPLEMENTINTERV_GEN_ALL_ED
Implemented All Fall with Harm Risk Interventions:  [place] to call system. Call bell, personal items and telephone within reach. Instruct patient to call for assistance. Room bathroom lighting operational. Non-slip footwear when patient is off stretcher. Physically safe environment: no spills, clutter or unnecessary equipment. Stretcher in lowest position, wheels locked, appropriate side rails in place. Provide visual cue, wrist band, yellow gown, etc. Monitor gait and stability. Monitor for mental status changes and reorient to person, place, and time. Review medications for side effects contributing to fall risk. Reinforce activity limits and safety measures with patient and family. Provide visual clues: red socks.

## 2020-04-23 NOTE — CONSULT NOTE ADULT - PROBLEM SELECTOR RECOMMENDATION 4
- Home meds: On oral agents, Glimepiride and Metformin daily.   - Hold oral agents during hospitalization, restart upon d/c.   - Follow up Hb A1c.   - Fingerstick monitoring, low dose sliding scale coverage.   - May need low dose Lantus if glucose remains >300, REPEAT now.   - IF >300, can start weight based Lantus 0.1mg /kg- CHECK WEIGHT NOW.  - IF <300 can likely manage glucose trend with Sliding Scale coverage.   - Will be NPO from midnight, can switch to q6h fingerstick monitoring. - Home meds: On oral agents, Glimepiride and Metformin daily.   - Hold oral agents during hospitalization, restart upon d/c.   - Follow up Hb A1c.   - Fingerstick monitoring, low dose sliding scale coverage.   - May need low dose Lantus if glucose remains >300, REPEAT now.   - IF persistently >300, consider weight based Lantus. CHECK WEIGHT.  - IF <300 can likely manage glucose trend with Sliding Scale coverage.   - Will be NPO from midnight, can switch to q6h fingerstick monitoring.

## 2020-04-24 NOTE — CHART NOTE - NSCHARTNOTEFT_GEN_A_CORE
Dr. Scott contacted the patient's daughter to discuss how the patient was doing. Daughter was concerned because while the patient was home after her initial fall, she was moving her right side (particularly her right arm) much less than normal. The patient also was previously communicative, though with dementia, and progressed to being non-interactive and non-verbal.     Due to the pre-hospitalization changes in status, a neurology consult was called

## 2020-04-24 NOTE — CONSULT NOTE ADULT - SUBJECTIVE AND OBJECTIVE BOX
Neurology Consult Note -- Incomplete    HPI:  89y Female PMHx dementia, HTN, CVA (on aspirin, Plavix), HLD, CAD, DM presents s/p mech fall on Saturday c/o severe R hip pain and inability to ambulate.  Patient's daughter endorses head strike but no LOC. Patient denies numbness/tingling/burning in the RLE. No other bone/joint complaints. Patient is a community ambulator at baseline with assistive devices. Patient was in Afib RVR  in ED.    (Stroke only)  NIHSS:   MRS:   ICH:     REVIEW OF SYSTEMS  A 10-system ROS was performed and is negative except for those items noted above and/or in the HPI.    PAST MEDICAL & SURGICAL HISTORY:  Hypertension  CVA (cerebral vascular accident)  Hyperlipemia  CAD (coronary artery disease)  Diabetes    FAMILY HISTORY:    SOCIAL HISTORY:   T/E/D:   Occupation:   Lives with:     MEDICATIONS (HOME):  Home Medications:  amLODIPine 10 mg oral tablet: 1 tab(s) orally once a day (23 Apr 2020 14:55)  Aspir 81 oral delayed release tablet: 1 tab(s) orally once a day (23 Apr 2020 14:55)  clopidogrel 75 mg oral tablet: 1 tab(s) orally once a day (23 Apr 2020 14:55)  famotidine 20 mg oral tablet: 1 tab(s) orally 2 times a day (23 Apr 2020 14:55)  fosinopril 40 mg oral tablet: 1 tab(s) orally once a day (23 Apr 2020 14:55)  glimepiride 4 mg oral tablet: 1 tab(s) orally 2 times a day (23 Apr 2020 14:55)  metFORMIN 500 mg oral tablet: 1 tab(s) orally 2 times a day (23 Apr 2020 14:55)    MEDICATIONS  (STANDING):  acetaminophen   Tablet .. 975 milliGRAM(s) Oral every 8 hours  ceFAZolin   IVPB 2000 milliGRAM(s) IV Intermittent every 8 hours  dextrose 5%. 1000 milliLiter(s) (50 mL/Hr) IV Continuous <Continuous>  dextrose 50% Injectable 12.5 Gram(s) IV Push once  dextrose 50% Injectable 25 Gram(s) IV Push once  dextrose 50% Injectable 25 Gram(s) IV Push once  diltiazem Infusion 7.5 mG/Hr (7.5 mL/Hr) IV Continuous <Continuous>  famotidine    Tablet 20 milliGRAM(s) Oral two times a day  insulin lispro (HumaLOG) corrective regimen sliding scale   SubCutaneous Before meals and at bedtime  potassium chloride  10 mEq/100 mL IVPB 10 milliEquivalent(s) IV Intermittent every 1 hour  propranolol 10 milliGRAM(s) Oral three times a day  senna 2 Tablet(s) Oral at bedtime  sodium chloride 0.9%. 1000 milliLiter(s) (125 mL/Hr) IV Continuous <Continuous>    MEDICATIONS  (PRN):  dextrose 40% Gel 15 Gram(s) Oral once PRN Blood Glucose LESS THAN 70 milliGRAM(s)/deciliter  glucagon  Injectable 1 milliGRAM(s) IntraMuscular once PRN Glucose LESS THAN 70 milligrams/deciliter  oxyCODONE    IR 2.5 milliGRAM(s) Oral every 4 hours PRN Moderate Pain (4 - 6)  oxyCODONE    IR 5 milliGRAM(s) Oral every 4 hours PRN Severe Pain (7 - 10)    ALLERGIES/INTOLERANCES:  Allergies  No Known Allergies    Intolerances    VITALS & EXAMINATION:  Vital Signs Last 24 Hrs  T(C): 36.2 (24 Apr 2020 15:20), Max: 37.1 (23 Apr 2020 20:15)  T(F): 97.2 (24 Apr 2020 15:20), Max: 98.7 (23 Apr 2020 20:15)  HR: 96 (24 Apr 2020 16:45) (78 - 119)  BP: 97/63 (24 Apr 2020 16:45) (74/51 - 133/79)  BP(mean): --  RR: 21 (24 Apr 2020 16:45) (16 - 23)  SpO2: 99% (24 Apr 2020 16:45) (96% - 100%)    General:  Constitutional: Female, appears stated age, in no apparent distress including pain  Head: Normocephalic & atraumatic.  Extremities: No cyanosis, clubbing, or edema.  Skin: No rashes, bruising, or discoloration.    Neurological (>12):  MS: Awake, alert. Non-verbal. Normal affect. Does not follows any commands.    Language: No verbal output; not following commands.     CNs: PERRLA (R = 2mm, L = 2mm). +No BTT on the R. +L gaze deviation which does not correct with Oculeocephalic reflex. +R Nasolabial fold flattening. Gag reflex deferred.     Fundoscopic: Not visualized.     Motor:   R	No effort against gravity; no spontaneous movement.	  L	Some effort against gravity; moves spontaneously    	  R	No effort against gravity; Wiggles toes spontaneously; No withdrawal to noxious stimuli 	   L	Moves spontaneously; No effort against gravity; Brisk withdrawal to noxious stimuli    Sensation: Grimaces to noxious stimuli (LUE>RUE) b/l throughout.     Reflexes:                     Plantar Resp  R		Mute  L		Upgoing    Coordination: Unable to assess -- patient not following commands.     Gait: Not able to assess.     LABORATORY:  CBC                       10.1   16.05 )-----------( 255      ( 24 Apr 2020 16:11 )             30.9     Chem 04-24    141  |  109<H>  |  19  ----------------------------<  119<H>  2.8<LL>   |  17<L>  |  0.67    Ca    8.2<L>      24 Apr 2020 16:11    TPro  x   /  Alb  3.7  /  TBili  x   /  DBili  x   /  AST  x   /  ALT  x   /  AlkPhos  x   04-23    LFTs LIVER FUNCTIONS - ( 23 Apr 2020 16:28 )  Alb: 3.7 g/dL / Pro: x     / ALK PHOS: x     / ALT: x     / AST: x     / GGT: x           Coagulopathy PT/INR - ( 24 Apr 2020 04:30 )   PT: 11.6 SEC;   INR: 1.02          PTT - ( 24 Apr 2020 04:30 )  PTT:23.1 SEC  Lipid Panel   A1c   Cardiac enzymes     U/A   CSF  Immunological  Other    STUDIES & IMAGING:  Studies (EKG, EEG, EMG, etc):     Radiology (XR, CT, MR, U/S, TTE/MOUNIKA):  < from: CT Head No Cont (04.23.20 @ 12:38) >  IMPRESSION: Age-appropriate of involutional and ischemic gliotic changes. No hemorrhage.   < end of copied text > Neurology Consult Note -- Incomplete    History obtained from EMR and patient's daughter, Viktoria ([phone])    HPI:  89y/o RH Female PMHx Alzheimer's dementia (AAOx1-2 at baseline; doesn't know month), Former heavy smoker, HTN, prior Right parietal CVA (on Aspirin, Plavix), HLD, CAD, DM presents s/p mech fall on Saturday c/o severe R hip pain and inability to ambulate.  Patient's daughter endorses head strike but no LOC. Patient denies numbness/tingling/burning in the RLE. No other bone/joint complaints. Patient is a community ambulator at baseline with assistive devices. Patient was in Afib RVR  in ED.    Per patient's daughter, patient woke up during the night and fell on her backside and hurt her hip on Saturday (4/18/2020). Patient was verbalizing that she was in pain but because of COVID pandemic deferred taking her to the hospital. Pain continued to worsen and after 2 1/2 days patient began to have ecchymoses. Ambulance called on 4/23 morning. On Wednesday morning around 11:30am patient was eating, talking, and responsive but a few hours later patient was no longer speaking. Tried to drink milk but was grabbing the cup with the left hand and had difficulty swallowing. Patient continued to progress in her symptoms and was no longer following commands or responding to questioning.        (Stroke only)  NIHSS: 25  MRS: 3  ICH:     REVIEW OF SYSTEMS  A 10-system ROS was performed and is negative except for those items noted above and/or in the HPI.    PAST MEDICAL & SURGICAL HISTORY:  Hypertension  CVA (cerebral vascular accident)  Hyperlipemia  CAD (coronary artery disease)  Diabetes    FAMILY HISTORY:    SOCIAL HISTORY:   T/E/D:   Occupation:   Lives with:     MEDICATIONS (HOME):  Home Medications:  amLODIPine 10 mg oral tablet: 1 tab(s) orally once a day (23 Apr 2020 14:55)  Aspir 81 oral delayed release tablet: 1 tab(s) orally once a day (23 Apr 2020 14:55)  clopidogrel 75 mg oral tablet: 1 tab(s) orally once a day (23 Apr 2020 14:55)  famotidine 20 mg oral tablet: 1 tab(s) orally 2 times a day (23 Apr 2020 14:55)  fosinopril 40 mg oral tablet: 1 tab(s) orally once a day (23 Apr 2020 14:55)  glimepiride 4 mg oral tablet: 1 tab(s) orally 2 times a day (23 Apr 2020 14:55)  metFORMIN 500 mg oral tablet: 1 tab(s) orally 2 times a day (23 Apr 2020 14:55)    MEDICATIONS  (STANDING):  acetaminophen   Tablet .. 975 milliGRAM(s) Oral every 8 hours  ceFAZolin   IVPB 2000 milliGRAM(s) IV Intermittent every 8 hours  dextrose 5%. 1000 milliLiter(s) (50 mL/Hr) IV Continuous <Continuous>  dextrose 50% Injectable 12.5 Gram(s) IV Push once  dextrose 50% Injectable 25 Gram(s) IV Push once  dextrose 50% Injectable 25 Gram(s) IV Push once  diltiazem Infusion 7.5 mG/Hr (7.5 mL/Hr) IV Continuous <Continuous>  famotidine    Tablet 20 milliGRAM(s) Oral two times a day  insulin lispro (HumaLOG) corrective regimen sliding scale   SubCutaneous Before meals and at bedtime  potassium chloride  10 mEq/100 mL IVPB 10 milliEquivalent(s) IV Intermittent every 1 hour  propranolol 10 milliGRAM(s) Oral three times a day  senna 2 Tablet(s) Oral at bedtime  sodium chloride 0.9%. 1000 milliLiter(s) (125 mL/Hr) IV Continuous <Continuous>    MEDICATIONS  (PRN):  dextrose 40% Gel 15 Gram(s) Oral once PRN Blood Glucose LESS THAN 70 milliGRAM(s)/deciliter  glucagon  Injectable 1 milliGRAM(s) IntraMuscular once PRN Glucose LESS THAN 70 milligrams/deciliter  oxyCODONE    IR 2.5 milliGRAM(s) Oral every 4 hours PRN Moderate Pain (4 - 6)  oxyCODONE    IR 5 milliGRAM(s) Oral every 4 hours PRN Severe Pain (7 - 10)    ALLERGIES/INTOLERANCES:  Allergies  No Known Allergies    Intolerances    VITALS & EXAMINATION:  Vital Signs Last 24 Hrs  T(C): 36.2 (24 Apr 2020 15:20), Max: 37.1 (23 Apr 2020 20:15)  T(F): 97.2 (24 Apr 2020 15:20), Max: 98.7 (23 Apr 2020 20:15)  HR: 96 (24 Apr 2020 16:45) (78 - 119)  BP: 97/63 (24 Apr 2020 16:45) (74/51 - 133/79)  BP(mean): --  RR: 21 (24 Apr 2020 16:45) (16 - 23)  SpO2: 99% (24 Apr 2020 16:45) (96% - 100%)    General:  Constitutional: Female, appears stated age, in no apparent distress including pain  Head: Normocephalic & atraumatic.  Extremities: No cyanosis, clubbing, or edema.  Skin: No rashes, bruising, or discoloration.    Neurological (>12):  MS: Awake, alert. Non-verbal. Normal affect. Does not follows any commands.    Language: No verbal output; not following commands.     CNs: PERRLA (R = 2mm, L = 2mm). +No BTT on the R. +L gaze deviation which does not correct with Oculeocephalic reflex. +R Nasolabial fold flattening. Gag reflex deferred.     Fundoscopic: Not visualized.     Motor:   R	No effort against gravity; no spontaneous movement.	  L	Some effort against gravity; moves spontaneously      ----- +gaegenhalt paratonia    	  R	No effort against gravity; Wiggles toes spontaneously; No withdrawal to noxious stimuli 	   L	Moves spontaneously; No effort against gravity; Brisk withdrawal to noxious stimuli    Sensation: Grimaces to noxious stimuli (LUE>RUE) b/l throughout.     Reflexes:                     Plantar Resp  R		Mute  L		Upgoing    Coordination: Unable to assess -- patient not following commands.     Gait: Not able to assess.     LABORATORY:  CBC                       10.1   16.05 )-----------( 255      ( 24 Apr 2020 16:11 )             30.9     Chem 04-24    141  |  109<H>  |  19  ----------------------------<  119<H>  2.8<LL>   |  17<L>  |  0.67    Ca    8.2<L>      24 Apr 2020 16:11    TPro  x   /  Alb  3.7  /  TBili  x   /  DBili  x   /  AST  x   /  ALT  x   /  AlkPhos  x   04-23    LFTs LIVER FUNCTIONS - ( 23 Apr 2020 16:28 )  Alb: 3.7 g/dL / Pro: x     / ALK PHOS: x     / ALT: x     / AST: x     / GGT: x           Coagulopathy PT/INR - ( 24 Apr 2020 04:30 )   PT: 11.6 SEC;   INR: 1.02          PTT - ( 24 Apr 2020 04:30 )  PTT:23.1 SEC  Lipid Panel   A1c   Cardiac enzymes     U/A   CSF  Immunological  Other    STUDIES & IMAGING:  Studies (EKG, EEG, EMG, etc):     Radiology (XR, CT, MR, U/S, TTE/MOUNIKA):  < from: CT Head No Cont (04.23.20 @ 12:38) >  IMPRESSION: Age-appropriate of involutional and ischemic gliotic changes. No hemorrhage.   < end of copied text > Neurology Consult Note    History obtained from EMR and patient's daughter, Viktoria ([phone])    HPI:  89y/o RH Female PMHx Alzheimer's dementia (AAOx1-2 at baseline; doesn't know month), Former heavy smoker, HTN, prior Right parietal CVA (on Aspirin, Plavix), HLD, CAD, DM presents s/p mech fall on Saturday c/o severe R hip pain and inability to ambulate.  Patient's daughter endorses head strike but no LOC. Patient denies numbness/tingling/burning in the RLE. No other bone/joint complaints. Patient is a community ambulator at baseline with assistive devices. Patient was in Afib RVR  in ED.    Per patient's daughter, patient woke up during the night and fell on her backside and hurt her hip on Saturday (4/18/2020). Patient was verbalizing that she was in pain but because of COVID pandemic deferred taking her to the hospital. Pain continued to worsen and after 2 1/2 days patient began to have ecchymoses. Ambulance called on 4/23 morning. On Wednesday morning around 11:30am patient was eating, talking, and responsive but a few hours later patient was no longer speaking. Tried to drink milk but was grabbing the cup with the left hand and had difficulty swallowing. Patient continued to progress in her symptoms and was no longer following commands or responding to questioning.        (Stroke only)  NIHSS: 25  MRS: 3  ICH:     REVIEW OF SYSTEMS  A 10-system ROS was performed and is negative except for those items noted above and/or in the HPI.    PAST MEDICAL & SURGICAL HISTORY:  Hypertension  CVA (cerebral vascular accident)  Hyperlipemia  CAD (coronary artery disease)  Diabetes    FAMILY HISTORY:    SOCIAL HISTORY:   T/E/D:   Occupation:   Lives with:     MEDICATIONS (HOME):  Home Medications:  amLODIPine 10 mg oral tablet: 1 tab(s) orally once a day (23 Apr 2020 14:55)  Aspir 81 oral delayed release tablet: 1 tab(s) orally once a day (23 Apr 2020 14:55)  clopidogrel 75 mg oral tablet: 1 tab(s) orally once a day (23 Apr 2020 14:55)  famotidine 20 mg oral tablet: 1 tab(s) orally 2 times a day (23 Apr 2020 14:55)  fosinopril 40 mg oral tablet: 1 tab(s) orally once a day (23 Apr 2020 14:55)  glimepiride 4 mg oral tablet: 1 tab(s) orally 2 times a day (23 Apr 2020 14:55)  metFORMIN 500 mg oral tablet: 1 tab(s) orally 2 times a day (23 Apr 2020 14:55)    MEDICATIONS  (STANDING):  acetaminophen   Tablet .. 975 milliGRAM(s) Oral every 8 hours  ceFAZolin   IVPB 2000 milliGRAM(s) IV Intermittent every 8 hours  dextrose 5%. 1000 milliLiter(s) (50 mL/Hr) IV Continuous <Continuous>  dextrose 50% Injectable 12.5 Gram(s) IV Push once  dextrose 50% Injectable 25 Gram(s) IV Push once  dextrose 50% Injectable 25 Gram(s) IV Push once  diltiazem Infusion 7.5 mG/Hr (7.5 mL/Hr) IV Continuous <Continuous>  famotidine    Tablet 20 milliGRAM(s) Oral two times a day  insulin lispro (HumaLOG) corrective regimen sliding scale   SubCutaneous Before meals and at bedtime  potassium chloride  10 mEq/100 mL IVPB 10 milliEquivalent(s) IV Intermittent every 1 hour  propranolol 10 milliGRAM(s) Oral three times a day  senna 2 Tablet(s) Oral at bedtime  sodium chloride 0.9%. 1000 milliLiter(s) (125 mL/Hr) IV Continuous <Continuous>    MEDICATIONS  (PRN):  dextrose 40% Gel 15 Gram(s) Oral once PRN Blood Glucose LESS THAN 70 milliGRAM(s)/deciliter  glucagon  Injectable 1 milliGRAM(s) IntraMuscular once PRN Glucose LESS THAN 70 milligrams/deciliter  oxyCODONE    IR 2.5 milliGRAM(s) Oral every 4 hours PRN Moderate Pain (4 - 6)  oxyCODONE    IR 5 milliGRAM(s) Oral every 4 hours PRN Severe Pain (7 - 10)    ALLERGIES/INTOLERANCES:  Allergies  No Known Allergies    Intolerances    VITALS & EXAMINATION:  Vital Signs Last 24 Hrs  T(C): 36.2 (24 Apr 2020 15:20), Max: 37.1 (23 Apr 2020 20:15)  T(F): 97.2 (24 Apr 2020 15:20), Max: 98.7 (23 Apr 2020 20:15)  HR: 96 (24 Apr 2020 16:45) (78 - 119)  BP: 97/63 (24 Apr 2020 16:45) (74/51 - 133/79)  BP(mean): --  RR: 21 (24 Apr 2020 16:45) (16 - 23)  SpO2: 99% (24 Apr 2020 16:45) (96% - 100%)    General:  Constitutional: Female, appears stated age, in no apparent distress including pain  Head: Normocephalic & atraumatic.  Extremities: No cyanosis, clubbing, or edema.  Skin: No rashes, bruising, or discoloration.    Neurological (>12):  MS: Awake, alert. Non-verbal. Normal affect. Does not follows any commands.    Language: No verbal output; not following commands.     CNs: PERRLA (R = 2mm, L = 2mm). +No BTT on the R. +L gaze deviation which does not correct with Oculeocephalic reflex. +R Nasolabial fold flattening. Gag reflex deferred.     Fundoscopic: Not visualized.     Motor:   R	No effort against gravity; no spontaneous movement.	  L	Some effort against gravity; moves spontaneously      ----- +gaegenhalt paratonia    	  R	No effort against gravity; Wiggles toes spontaneously; No withdrawal to noxious stimuli 	   L	Moves spontaneously; No effort against gravity; Brisk withdrawal to noxious stimuli    Sensation: Grimaces to noxious stimuli (LUE>RUE) b/l throughout.     Reflexes:                     Plantar Resp  R		Mute  L		Upgoing    Coordination: Unable to assess -- patient not following commands.     Gait: Not able to assess.     LABORATORY:  CBC                       10.1   16.05 )-----------( 255      ( 24 Apr 2020 16:11 )             30.9     Chem 04-24    141  |  109<H>  |  19  ----------------------------<  119<H>  2.8<LL>   |  17<L>  |  0.67    Ca    8.2<L>      24 Apr 2020 16:11    TPro  x   /  Alb  3.7  /  TBili  x   /  DBili  x   /  AST  x   /  ALT  x   /  AlkPhos  x   04-23    LFTs LIVER FUNCTIONS - ( 23 Apr 2020 16:28 )  Alb: 3.7 g/dL / Pro: x     / ALK PHOS: x     / ALT: x     / AST: x     / GGT: x           Coagulopathy PT/INR - ( 24 Apr 2020 04:30 )   PT: 11.6 SEC;   INR: 1.02          PTT - ( 24 Apr 2020 04:30 )  PTT:23.1 SEC  Lipid Panel   A1c   Cardiac enzymes     U/A   CSF  Immunological  Other    STUDIES & IMAGING:  Studies (EKG, EEG, EMG, etc):     Radiology (XR, CT, MR, U/S, TTE/MOUNIKA):  < from: CT Head No Cont (04.23.20 @ 12:38) >  IMPRESSION: Age-appropriate of involutional and ischemic gliotic changes. No hemorrhage.   < end of copied text >

## 2020-04-24 NOTE — PROGRESS NOTE ADULT - SUBJECTIVE AND OBJECTIVE BOX
Subjective: Patient seen and examined. No new events except as noted.     SUBJECTIVE/ROS:        MEDICATIONS:  MEDICATIONS  (STANDING):  acetaminophen   Tablet .. 975 milliGRAM(s) Oral every 8 hours  dextrose 5%. 1000 milliLiter(s) (50 mL/Hr) IV Continuous <Continuous>  dextrose 50% Injectable 12.5 Gram(s) IV Push once  dextrose 50% Injectable 25 Gram(s) IV Push once  dextrose 50% Injectable 25 Gram(s) IV Push once  diltiazem Infusion 7.5 mG/Hr (7.5 mL/Hr) IV Continuous <Continuous>  famotidine    Tablet 20 milliGRAM(s) Oral two times a day  insulin lispro (HumaLOG) corrective regimen sliding scale   SubCutaneous Before meals and at bedtime  propranolol 10 milliGRAM(s) Oral three times a day  senna 2 Tablet(s) Oral at bedtime  sodium chloride 0.9%. 1000 milliLiter(s) (125 mL/Hr) IV Continuous <Continuous>      PHYSICAL EXAM:  T(C): 36.9 (04-24-20 @ 04:32), Max: 37.1 (04-23-20 @ 20:15)  HR: 87 (04-24-20 @ 04:32) (85 - 139)  BP: 117/75 (04-24-20 @ 04:32) (93/61 - 146/76)  RR: 16 (04-24-20 @ 04:32) (16 - 24)  SpO2: 98% (04-24-20 @ 04:32) (97% - 100%)  Wt(kg): --  I&O's Summary    23 Apr 2020 07:01  -  24 Apr 2020 07:00  --------------------------------------------------------  IN: 1557.5 mL / OUT: 600 mL / NET: 957.5 mL        Weight (kg): 51.3 (04-23 @ 20:15)      JVP: Normal  Neck: supple  Lung: clear   CV: S1 S2 , Murmur:  Abd: soft  Ext: No edema  neuro: Awake  Psych: flat affect  Skin: normal``    LABS/DATA:    CARDIAC MARKERS:                                11.3   9.22  )-----------( 269      ( 24 Apr 2020 04:30 )             33.2     04-24    139  |  106  |  24<H>  ----------------------------<  199<H>  3.3<L>   |  18<L>  |  0.68    Ca    8.4      24 Apr 2020 04:30    TPro  x   /  Alb  3.7  /  TBili  x   /  DBili  x   /  AST  x   /  ALT  x   /  AlkPhos  x   04-23    proBNP:   Lipid Profile:   HgA1c:   TSH:     TELE:  EKG:

## 2020-04-24 NOTE — CONSULT NOTE ADULT - ASSESSMENT
89y/o RH Female PMHx Alzheimer's dementia (AAOx1-2 at baseline; doesn't know month), Former heavy smoker, HTN, prior Right parietal CVA (on Aspirin, Plavix), HLD, CAD, DM [facility] c/o severe R hip pain and inability to ambulate.  Patient's daughter endorses head strike but no LOC. Patient denies numbness/tingling/burning in the RLE. No other bone/joint complaints. Patient is a community ambulator at baseline with assistive devices. Patient was in Afib RVR  in ED. Admitted for R Hip fracture s/p Fixation of R hip POD#0 (4/24).     Per patient's daughter, patient woke up during the night and fell on her backside and hurt her hip on Saturday (4/18/2020). Patient was verbalizing that she was in pain but because of COVID pandemic deferred taking her to the hospital. Pain continued to worsen and after 2 1/2 days patient began to have ecchymoses. Ambulance called on 4/23 morning. On Wednesday morning around 11:30am patient was eating, talking, and responsive but a few hours later patient was no longer speaking. Tried to drink milk but was grabbing the cup with the left hand and had difficulty swallowing. Patient continued to progress in her symptoms and was no longer following commands or responding to questioning.      (Stroke only)  NIHSS: 25  MRS: 3  LKN: 4/22/2020 @ 11:30AM  [facility] (4/23) demonstrating no acute pathology.     Impression: Global aphasia a/w R hemiparesis likely 2/2 L brain dysfunction; suspect L MCA ischemic stroke; alternate consideration include embolic shower with primarily L Fronto-temporal lobe dysfunction 2/2 cryptogenic etiology; suspect cardioembolism    Recommendations:   MRI brain without contrast  MRA head without contrast and neck with contrast  Would pursue TTE if in line with [facility]  Lipitor 80 mg PO QHS  HbA1c, LDL and continue with aggressive vascular risk factors modifications   NPO until patient passes dysphagia screen  Tele monitor  PT/OT/S&S eval  Palliative care evaluation 89y/o RH Female PMHx Alzheimer's dementia (AAOx1-2 at baseline; doesn't know month), Former heavy smoker, HTN, prior Right parietal CVA (on Aspirin, Plavix), HLD, CAD, DM presents s/p [facility] fall on Saturday (4/18) c/o severe R hip pain and inability to ambulate.  Patient's daughter endorses head strike but no LOC. Patient denies numbness/tingling/burning in the RLE. No other bone/joint complaints. Patient is a community ambulator at baseline with assistive devices. Patient was in Afib RVR  in ED. Admitted for R Hip fracture s/p Fixation of R hip POD#0 (4/24).   Per patient's daughter, patient woke up during the night and fell on her backside and hurt her hip on Saturday (4/18/2020). Patient was verbalizing that she was in pain but because of COVID pandemic deferred taking her to the hospital. Pain continued to worsen and after 2 1/2 days patient began to have ecchymoses. Ambulance called on 4/23 morning. On Wednesday morning around 11:30am patient was eating, talking, and responsive but a few hours later patient was no longer speaking. Tried to drink milk but was grabbing the cup with the left hand and had difficulty swallowing. Patient continued to progress in her symptoms and was no longer following commands or responding to questioning.      Neurologic exam remarkable for Awake, alert mental status. Non-verbal. Not following commands. L gaze deviation (does not correct), R nasolabial fold flattening, R hemiparesis.     (Stroke only)  NIHSS: 25  MRS: 3  LKN: 4/22/2020 @ 11:30AM  [facility] (4/23) demonstrating no acute pathology.     Impression: Global aphasia a/w R hemiparesis likely 2/2 L brain dysfunction; suspect L MCA ischemic stroke; alternate consideration include embolic shower with primarily L Fronto-temporal lobe dysfunction 2/2 cryptogenic etiology; suspect cardioembolism    Recommendations:   MRI brain without contrast  MRA head without contrast and neck with contrast  Would pursue TTE if in line with [facility]  Lipitor 80 mg PO QHS  HbA1c, LDL and continue with aggressive vascular risk factors modifications   NPO until patient passes dysphagia screen  Tele monitor  PT/OT/S&S eval  Palliative care evaluation

## 2020-04-24 NOTE — PROGRESS NOTE ADULT - ASSESSMENT
89 year old [place]ronesian-speaking female patient w/ PMH of CAD, HTN, HLD, CVA on Plavix, DM2 -- BIBEMS from home for worsening mental status and found with R hip fracture s/p fall. Now s/p ORIF on 4/24. Also with A fib in RVR on admission, converted to SR with stabilized HR on cardizem gtt.

## 2020-04-24 NOTE — PROGRESS NOTE ADULT - SUBJECTIVE AND OBJECTIVE BOX
Medicine Progress note    Patient is a 89y old  Female who presents with a chief complaint of R hip fx (24 Apr 2020 08:23)      SUBJECTIVE / OVERNIGHT EVENTS:      MEDICATIONS  (STANDING):  acetaminophen   Tablet .. 975 milliGRAM(s) Oral every 8 hours  dextrose 5%. 1000 milliLiter(s) (50 mL/Hr) IV Continuous <Continuous>  dextrose 50% Injectable 12.5 Gram(s) IV Push once  dextrose 50% Injectable 25 Gram(s) IV Push once  dextrose 50% Injectable 25 Gram(s) IV Push once  diltiazem Infusion 7.5 mG/Hr (7.5 mL/Hr) IV Continuous <Continuous>  famotidine    Tablet 20 milliGRAM(s) Oral two times a day  insulin lispro (HumaLOG) corrective regimen sliding scale   SubCutaneous Before meals and at bedtime  propranolol 10 milliGRAM(s) Oral three times a day  senna 2 Tablet(s) Oral at bedtime  sodium chloride 0.9%. 1000 milliLiter(s) (125 mL/Hr) IV Continuous <Continuous>    MEDICATIONS  (PRN):  dextrose 40% Gel 15 Gram(s) Oral once PRN Blood Glucose LESS THAN 70 milliGRAM(s)/deciliter  glucagon  Injectable 1 milliGRAM(s) IntraMuscular once PRN Glucose LESS THAN 70 milligrams/deciliter  oxyCODONE    IR 2.5 milliGRAM(s) Oral every 4 hours PRN Moderate Pain (4 - 6)  oxyCODONE    IR 5 milliGRAM(s) Oral every 4 hours PRN Severe Pain (7 - 10)      CAPILLARY BLOOD GLUCOSE      POCT Blood Glucose.: 274 mg/dL (24 Apr 2020 09:11)  POCT Blood Glucose.: 188 mg/dL (24 Apr 2020 04:41)  POCT Blood Glucose.: 236 mg/dL (23 Apr 2020 21:47)  POCT Blood Glucose.: 353 mg/dL (23 Apr 2020 18:13)    I&O's Summary    23 Apr 2020 07:01  -  24 Apr 2020 07:00  --------------------------------------------------------  IN: 1557.5 mL / OUT: 600 mL / NET: 957.5 mL        PHYSICAL EXAM:  Vital Signs Last 24 Hrs  T(C): 37.1 (24 Apr 2020 10:38), Max: 37.1 (23 Apr 2020 20:15)  T(F): 98.7 (24 Apr 2020 10:38), Max: 98.7 (23 Apr 2020 20:15)  HR: 86 (24 Apr 2020 10:38) (85 - 139)  BP: 111/74 (24 Apr 2020 10:38) (93/61 - 146/76)  BP(mean): --  RR: 18 (24 Apr 2020 10:38) (16 - 24)  SpO2: 98% (24 Apr 2020 10:38) (97% - 100%)  CONSTITUTIONAL: NAD, well-developed  RESPIRATORY: Normal respiratory effort; lungs are clear to auscultation bilaterally  CARDIOVASCULAR: Regular rate and rhythm, No lower extremity edema  ABDOMEN: Nontender to palpation, normoactive bowel sounds, no rebound/guarding  PSYCH/NEURO: A+O; affect appropriate  SKIN: No rashes; no palpable lesions    LABS:                        11.3   9.22  )-----------( 269      ( 24 Apr 2020 04:30 )             33.2     04-24    139  |  106  |  24<H>  ----------------------------<  199<H>  3.3<L>   |  18<L>  |  0.68    Ca    8.4      24 Apr 2020 04:30    TPro  x   /  Alb  3.7  /  TBili  x   /  DBili  x   /  AST  x   /  ALT  x   /  AlkPhos  x   04-23    PT/INR - ( 24 Apr 2020 04:30 )   PT: 11.6 SEC;   INR: 1.02          PTT - ( 24 Apr 2020 04:30 )  PTT:23.1 SEC          COVID-19 PCR: NotDetec (04-23-20 @ 17:28)      RADIOLOGY & ADDITIONAL TESTS:  Imaging from Last 24 Hours:    Electrocardiogram/QTc Interval:    Case discussed with: Medicine Progress note    Patient is a 89y old  Female who presents with a chief complaint of R hip fx (24 Apr 2020 08:23)    SUBJECTIVE / OVERNIGHT EVENTS: Pt in OR, able to examine. Chart reviewed thoroughly. Conversion to SR this AM, HR stable on Cardizem gtt and BB. High blood glucose since admission in setting of known DM2.     MEDICATIONS  (STANDING):  acetaminophen   Tablet .. 975 milliGRAM(s) Oral every 8 hours  dextrose 5%. 1000 milliLiter(s) (50 mL/Hr) IV Continuous <Continuous>  dextrose 50% Injectable 12.5 Gram(s) IV Push once  dextrose 50% Injectable 25 Gram(s) IV Push once  dextrose 50% Injectable 25 Gram(s) IV Push once  diltiazem Infusion 7.5 mG/Hr (7.5 mL/Hr) IV Continuous <Continuous>  famotidine    Tablet 20 milliGRAM(s) Oral two times a day  insulin lispro (HumaLOG) corrective regimen sliding scale SubCutaneous Before meals and at bedtime  propranolol 10 milliGRAM(s) Oral three times a day  senna 2 Tablet(s) Oral at bedtime  sodium chloride 0.9%. 1000 milliLiter(s) (125 mL/Hr) IV Continuous <Continuous>    MEDICATIONS  (PRN):  dextrose 40% Gel 15 Gram(s) Oral once PRN Blood Glucose LESS THAN 70 milliGRAM(s)/deciliter  glucagon  Injectable 1 milliGRAM(s) IntraMuscular once PRN Glucose LESS THAN 70 milligrams/deciliter  oxyCODONE    IR 2.5 milliGRAM(s) Oral every 4 hours PRN Moderate Pain (4 - 6)  oxyCODONE    IR 5 milliGRAM(s) Oral every 4 hours PRN Severe Pain (7 - 10)    CAPILLARY BLOOD GLUCOSE    POCT Blood Glucose.: 274 mg/dL (24 Apr 2020 09:11)  POCT Blood Glucose.: 188 mg/dL (24 Apr 2020 04:41)  POCT Blood Glucose.: 236 mg/dL (23 Apr 2020 21:47)  POCT Blood Glucose.: 353 mg/dL (23 Apr 2020 18:13)    I&O's Summary    23 Apr 2020 07:01  -  24 Apr 2020 07:00  --------------------------------------------------------  IN: 1557.5 mL / OUT: 600 mL / NET: 957.5 mL    PHYSICAL EXAM:  Vital Signs Last 24 Hrs  T(C): 37.1 (24 Apr 2020 10:38), Max: 37.1 (23 Apr 2020 20:15)  T(F): 98.7 (24 Apr 2020 10:38), Max: 98.7 (23 Apr 2020 20:15)  HR: 86 (24 Apr 2020 10:38) (85 - 139)  BP: 111/74 (24 Apr 2020 10:38) (93/61 - 146/76)  RR: 18 (24 Apr 2020 10:38) (16 - 24)  SpO2: 98% (24 Apr 2020 10:38) (97% - 100%)    Unable to examine patient. Please refer to last exam documented.     LABS:                        11.3   9.22  )-----------( 269      ( 24 Apr 2020 04:30 )             33.2     04-24    139  |  106  |  24<H>  ----------------------------<  199<H>  3.3<L>   |  18<L>  |  0.68    Ca    8.4      24 Apr 2020 04:30    TPro  x   /  Alb  3.7  /  TBili  x   /  DBili  x   /  AST  x   /  ALT  x   /  AlkPhos  x   04-23    PT/INR - ( 24 Apr 2020 04:30 )   PT: 11.6 SEC;   INR: 1.02          PTT - ( 24 Apr 2020 04:30 )  PTT:23.1 SEC    COVID-19 PCR: NotDetec (04-23-20 @ 17:28)    RADIOLOGY & ADDITIONAL TESTS:  Imaging from Last 24 Hours:  < from: Xray Chest 1 View- PORTABLE-Urgent (04.23.20 @ 12:24) >  Slight right hemidiaphragm elevation.    Clear lungs. No pleural effusions or pneumothorax.    Heart size and mediastinal width inaccurately assessed on the projection. Aortic and mitral annular calcifications.     Trachea midline.    Generalized osteopenia. Right shoulder arthritic change. No acute or focally aggressive appearing osseous abnormalities.    < end of copied text >    < from: Xray Hip 1 View, Right (04.23.20 @ 14:41) >  Redemonstrated proximal right femoral intertrochanteric fracture. Decreased varus angulation with applied traction residual offset medial cortical margins.    Preserved right hip joint space.    Generalized osteopenia. No discrete lytic or blastic lesions.    < end of copied text >    < from: Xray Femur 2 Views, Right (04.23.20 @ 12:24) >  Acute varus angulated proximal right femoral intertrochanteric fracture with lesser trochanteric avulsion. No dislocations or additional fractures in the remaining imaged regions.    Intact pelvic and obturator rings and symmetrically aligned and spaced SI joints and pubic symphysis.    Preserved bilateral hip joint spaces. Right knee medial tibiofemoral compartment predominant osteoarthritic change.    Generalized osteopenia otherwise no discrete lytic or blastic lesions particularly around the fracture margins to suspect a pathologic etiology.    Vascular calcifications.    < end of copied text >    CT brain/CT C spine -   IMPRESSION: Age-appropriate of involutional and ischemic gliotic changes. No hemorrhage.   IMPRESSION: No acute fractures or dislocations. Moderate degenerative changes.  < end of copied text >

## 2020-04-24 NOTE — PROGRESS NOTE ADULT - PROBLEM SELECTOR PLAN 4
Previously on DAPT, off at present  - will ask cardiology to comment on use of platelet agents concomitantly with Eliquis

## 2020-04-24 NOTE — PROGRESS NOTE ADULT - ASSESSMENT
MAT  AFIB with RVR  now in sinus rhythm   cont cardizem infusion   cont low dose BB as ordered   The calculated [id number] score is 7 , high risk for future cva, recommend low dose eliquis post op if no objection     HTN  stable     DM type II  Monitor finger stick. Insulin coverage.   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

## 2020-04-24 NOTE — PROGRESS NOTE ADULT - SUBJECTIVE AND OBJECTIVE BOX
Orthopaedic Surgery Progress Note    Subjective:   Patient seen and examined  No acute events overnight  Patient broke afib into sinus rhythm over night, HR 80s.    Objective:  T(C): 36.9 (04-24-20 @ 04:32), Max: 37.1 (04-23-20 @ 20:15)  HR: 87 (04-24-20 @ 04:32) (85 - 139)  BP: 117/75 (04-24-20 @ 04:32) (93/61 - 146/76)  RR: 16 (04-24-20 @ 04:32) (16 - 24)  SpO2: 98% (04-24-20 @ 04:32) (97% - 100%)  Wt(kg): --    04-23 @ 07:01  -  04-24 @ 06:21  --------------------------------------------------------  IN: 1557.5 mL / OUT: 600 mL / NET: 957.5 mL        PE    NAD  RLE:  skin closed  motor intact GS/TA/EHL  SILT S/S/SP/DP  WWP                          11.3   9.22  )-----------( 269      ( 24 Apr 2020 04:30 )             33.2     04-23    133<L>  |  99  |  29<H>  ----------------------------<  335<H>  3.6   |  19<L>  |  0.73    Ca    9.0      23 Apr 2020 11:45    TPro  x   /  Alb  3.7  /  TBili  x   /  DBili  x   /  AST  x   /  ALT  x   /  AlkPhos  x   04-23    PT/INR - ( 24 Apr 2020 04:30 )   PT: 11.6 SEC;   INR: 1.02          PTT - ( 24 Apr 2020 04:30 )  PTT:23.1 SEC      89y Female w/ R IT fx  - Plan for OR for ORIF today  - NPO/IVF  - Pending medicine and cardiology clearance  - FU TTE  - Pain control  - NWB RLE  - Venodynes  - FU AM labs

## 2020-04-24 NOTE — CONSULT NOTE ADULT - ATTENDING COMMENTS
Patient today alert, averbal, does not follow commands, marked left gaze deviation, moves left side spontaneously, no movement on right side with reduced tone. Right toe upgoing.   This looks like a new left-sided CVA. Will need repeat head first since last CT was 4/22, then MRI/A. On heparin drip now, continue until CT at least. If hemorrhagic conversion, will need to reassess that.

## 2020-04-24 NOTE — BRIEF OPERATIVE NOTE - NSICDXBRIEFPROCEDURE_GEN_ALL_CORE_FT
PROCEDURES:  Intramedullary rodding of right hip using intertrochanteric-subtrochanteric nail 24-Apr-2020 15:44:12  Minnie Goldstein

## 2020-04-24 NOTE — CHART NOTE - NSCHARTNOTEFT_GEN_A_CORE
ORTHO POST OP CHECK    S: Pt seen and examined. Doing well postoperatively. Pain well controlled. Denies N/V/CP/SOB.       O:   PE:  Gen: NAD, laying comfortably in bed, nonverbal, non, communicative  Resp: Unlabored breathing  MSK: Dressing c/d/i          +unable to assess sensation          +moves foot to stim           2+DP                          10.1   16.05 )-----------( 255      ( 24 Apr 2020 16:11 )             30.9     04-24    141  |  109<H>  |  19  ----------------------------<  119<H>  2.8<LL>   |  17<L>  |  0.67    Ca    8.2<L>      24 Apr 2020 16:11    TPro  x   /  Alb  3.7  /  TBili  x   /  DBili  x   /  AST  x   /  ALT  x   /  AlkPhos  x   04-23      Vital Signs Last 24 Hrs  T(C): 36 (24 Apr 2020 17:00), Max: 37.1 (23 Apr 2020 20:15)  T(F): 96.8 (24 Apr 2020 17:00), Max: 98.7 (23 Apr 2020 20:15)  HR: 113 (24 Apr 2020 18:30) (78 - 119)  BP: 143/94 (24 Apr 2020 18:30) (74/51 - 143/94)  BP(mean): --  RR: 23 (24 Apr 2020 18:30) (16 - 23)  SpO2: 100% (24 Apr 2020 18:30) (96% - 100%)  I&O's Summary    23 Apr 2020 07:01  -  24 Apr 2020 07:00  --------------------------------------------------------  IN: 1557.5 mL / OUT: 600 mL / NET: 957.5 mL    24 Apr 2020 07:01  -  24 Apr 2020 18:44  --------------------------------------------------------  IN: 535 mL / OUT: 0 mL / NET: 535 mL        A/P sp R Hip IMN    -Neuro: Multimodal pain control  -Resp: IS  -GI: reg diet  -MSK: OOB, WBAT, PT/OT  -Heme: DVT PPx    Ortho

## 2020-04-24 NOTE — PROGRESS NOTE ADULT - SUBJECTIVE AND OBJECTIVE BOX
ORTHO ATTENDING POST OP    S/P IM FIXATION R   HIP  WBAT   R LE  lovenox 40 QD  venodynes  ancef 1 g x 24 h  OOB to chair in AM  rehab consult   f/u medicine  CBC in RR and AM

## 2020-04-24 NOTE — PROGRESS NOTE ADULT - PROBLEM SELECTOR PLAN 7
- SQ Lovenox for now, start Eliquis in 24 hours if no bleeding for A fib A/C  - SCDs while not fully A/C  - transition to PO once anesthesia effects worn off -- DM2 diet

## 2020-04-24 NOTE — PROGRESS NOTE ADULT - PROBLEM SELECTOR PLAN 3
- NPO for procedure with high finger sticks  - will change to sliding scale q6 while NPO  - please change back to sliding scale with meals and QHS once tolerating PO

## 2020-04-24 NOTE — PROGRESS NOTE ADULT - PROBLEM SELECTOR PLAN 1
- s/p ORIF, ortho notes appreciated  - rehab consult in AM  - WBAT on RLE, OOB to chair in AM  - pain control with prn oxycodone per orders -- hold for sedation or RR <8

## 2020-04-25 NOTE — PROGRESS NOTE ADULT - SUBJECTIVE AND OBJECTIVE BOX
Medicine Progress note    Patient is a 89y old  Female who presents with a chief complaint of R hip fx (25 Apr 2020 09:12)    SUBJECTIVE / OVERNIGHT EVENTS:     MEDICATIONS  (STANDING):  atorvastatin 80 milliGRAM(s) Oral at bedtime  dextrose 5%. 1000 milliLiter(s) (50 mL/Hr) IV Continuous <Continuous>  dextrose 50% Injectable 12.5 Gram(s) IV Push once  dextrose 50% Injectable 25 Gram(s) IV Push once  dextrose 50% Injectable 25 Gram(s) IV Push once  diltiazem    Tablet 30 milliGRAM(s) Oral three times a day  famotidine    Tablet 20 milliGRAM(s) Oral two times a day  insulin lispro (HumaLOG) corrective regimen sliding scale   SubCutaneous Before meals and at bedtime  propranolol 10 milliGRAM(s) Oral three times a day  senna 2 Tablet(s) Oral at bedtime  sodium chloride 0.9%. 1000 milliLiter(s) (125 mL/Hr) IV Continuous <Continuous>    MEDICATIONS  (PRN):  acetaminophen  IVPB .. 1000 milliGRAM(s) IV Intermittent every 8 hours PRN Severe Pain (7 - 10)  dextrose 40% Gel 15 Gram(s) Oral once PRN Blood Glucose LESS THAN 70 milliGRAM(s)/deciliter  glucagon  Injectable 1 milliGRAM(s) IntraMuscular once PRN Glucose LESS THAN 70 milligrams/deciliter  oxyCODONE    IR 2.5 milliGRAM(s) Oral every 4 hours PRN Moderate Pain (4 - 6)  oxyCODONE    IR 5 milliGRAM(s) Oral every 4 hours PRN Severe Pain (7 - 10)      CAPILLARY BLOOD GLUCOSE      POCT Blood Glucose.: 229 mg/dL (25 Apr 2020 08:30)  POCT Blood Glucose.: 205 mg/dL (25 Apr 2020 04:23)  POCT Blood Glucose.: 246 mg/dL (24 Apr 2020 22:29)  POCT Blood Glucose.: 205 mg/dL (24 Apr 2020 12:01)    I&O's Summary    24 Apr 2020 07:01  -  25 Apr 2020 07:00  --------------------------------------------------------  IN: 2357.5 mL / OUT: 400 mL / NET: 1957.5 mL        PHYSICAL EXAM:  Vital Signs Last 24 Hrs  T(C): 36.9 (25 Apr 2020 11:34), Max: 37.2 (24 Apr 2020 22:00)  T(F): 98.4 (25 Apr 2020 11:34), Max: 99 (24 Apr 2020 22:00)  HR: 89 (25 Apr 2020 11:34) (78 - 119)  BP: 146/86 (25 Apr 2020 11:34) (74/51 - 146/86)  RR: 17 (25 Apr 2020 11:34) (16 - 24)  SpO2: 98% (25 Apr 2020 11:34) (95% - 100%)    CONSTITUTIONAL: NAD, well-developed  RESPIRATORY: Normal respiratory effort; lungs are clear to auscultation bilaterally  CARDIOVASCULAR: Regular rate and rhythm, No lower extremity edema  ABDOMEN: Nontender to palpation, normoactive bowel sounds, no rebound/guarding  PSYCH/NEURO: A+O; affect appropriate  SKIN: No rashes; no palpable lesions    LABS:                        9.2    10.03 )-----------( 234      ( 25 Apr 2020 07:00 )             28.3     04-25    139  |  109<H>  |  17  ----------------------------<  237<H>  3.8   |  14<L>  |  0.72    Ca    8.0<L>      25 Apr 2020 07:00    TPro  x   /  Alb  3.7  /  TBili  x   /  DBili  x   /  AST  x   /  ALT  x   /  AlkPhos  x   04-23    PT/INR - ( 25 Apr 2020 07:00 )   PT: 12.4 SEC;   INR: 1.08          PTT - ( 25 Apr 2020 07:00 )  PTT:22.8 SEC    COVID-19 PCR: NotDetec (04-23-20 @ 17:28)    RADIOLOGY & ADDITIONAL TESTS:  Imaging from Last 24 Hours:  Repeat CTH pending    Case discussed with: Medicine Progress note    Patient is a 89y old  Female who presents with a chief complaint of R hip fx (25 Apr 2020 09:12)    SUBJECTIVE / OVERNIGHT EVENTS: Pt seen/examined, minimally responsive to verbal stimuli. CT head repeat noted with large CVA, neurology recommendations noted.  Spoke with ortho who reached out to family and palliative to review goals of care. Additionally pt failed speech/swallow today.     MEDICATIONS  (STANDING):  atorvastatin 80 milliGRAM(s) Oral at bedtime  dextrose 5%. 1000 milliLiter(s) (50 mL/Hr) IV Continuous <Continuous>  dextrose 50% Injectable 12.5 Gram(s) IV Push once  dextrose 50% Injectable 25 Gram(s) IV Push once  dextrose 50% Injectable 25 Gram(s) IV Push once  diltiazem    Tablet 30 milliGRAM(s) Oral three times a day  famotidine    Tablet 20 milliGRAM(s) Oral two times a day  insulin lispro (HumaLOG) corrective regimen sliding scale   SubCutaneous Before meals and at bedtime  propranolol 10 milliGRAM(s) Oral three times a day  senna 2 Tablet(s) Oral at bedtime  sodium chloride 0.9%. 1000 milliLiter(s) (125 mL/Hr) IV Continuous <Continuous>    MEDICATIONS  (PRN):  acetaminophen  IVPB .. 1000 milliGRAM(s) IV Intermittent every 8 hours PRN Severe Pain (7 - 10)  dextrose 40% Gel 15 Gram(s) Oral once PRN Blood Glucose LESS THAN 70 milliGRAM(s)/deciliter  glucagon  Injectable 1 milliGRAM(s) IntraMuscular once PRN Glucose LESS THAN 70 milligrams/deciliter  oxyCODONE    IR 2.5 milliGRAM(s) Oral every 4 hours PRN Moderate Pain (4 - 6)  oxyCODONE    IR 5 milliGRAM(s) Oral every 4 hours PRN Severe Pain (7 - 10)    CAPILLARY BLOOD GLUCOSE    POCT Blood Glucose.: 229 mg/dL (25 Apr 2020 08:30)  POCT Blood Glucose.: 205 mg/dL (25 Apr 2020 04:23)  POCT Blood Glucose.: 246 mg/dL (24 Apr 2020 22:29)  POCT Blood Glucose.: 205 mg/dL (24 Apr 2020 12:01)    I&O's Summary    24 Apr 2020 07:01  -  25 Apr 2020 07:00  --------------------------------------------------------  IN: 2357.5 mL / OUT: 400 mL / NET: 1957.5 mL    PHYSICAL EXAM:  Vital Signs Last 24 Hrs  T(C): 36.9 (25 Apr 2020 11:34), Max: 37.2 (24 Apr 2020 22:00)  T(F): 98.4 (25 Apr 2020 11:34), Max: 99 (24 Apr 2020 22:00)  HR: 89 (25 Apr 2020 11:34) (78 - 119)  BP: 146/86 (25 Apr 2020 11:34) (74/51 - 146/86)  RR: 17 (25 Apr 2020 11:34) (16 - 24)  SpO2: 98% (25 Apr 2020 11:34) (95% - 100%)    CONSTITUTIONAL: NAD, eyes open but minimally responsive   RESPIRATORY: Normal respiratory effort  CARDIOVASCULAR: Regular rate and rhythm, No lower extremity edema  ABDOMEN: Nontender to palpation, normoactive bowel sounds, no rebound/guarding  Neuro: unable to assess as pt not following commands   SKIN: No rashes; no palpable lesions, + eccymoses over R leg and dressings at hip/pelvis area c/d/i    LABS:                        9.2    10.03 )-----------( 234      ( 25 Apr 2020 07:00 )             28.3     04-25    139  |  109<H>  |  17  ----------------------------<  237<H>  3.8   |  14<L>  |  0.72    Ca    8.0<L>      25 Apr 2020 07:00    TPro  x   /  Alb  3.7  /  TBili  x   /  DBili  x   /  AST  x   /  ALT  x   /  AlkPhos  x   04-23    PT/INR - ( 25 Apr 2020 07:00 )   PT: 12.4 SEC;   INR: 1.08          PTT - ( 25 Apr 2020 07:00 )  PTT:22.8 SEC    COVID-19 PCR: NotDetec (04-23-20 @ 17:28)    RADIOLOGY & ADDITIONAL TESTS:  Imaging from Last 24 Hours:  < from: CT Head No Cont (04.25.20 @ 14:31) >  Acute left MILIND and MCA territory infarct involving the left frontal and parietal lobes. No dmitriy hemorrhagic transformation.  Chronic right frontoparietal infarct.    < end of copied text >    Case discussed with: Ortho team

## 2020-04-25 NOTE — PHYSICAL THERAPY INITIAL EVALUATION ADULT - RANGE OF MOTION EXAMINATION, REHAB EVAL
bilateral lower extremity ROM was WFL (within functional limits)/bilateral upper extremity ROM was WFL (within functional limits)/right hip ROM 0-75 degrees assessed in sitting

## 2020-04-25 NOTE — PROGRESS NOTE ADULT - PROBLEM SELECTOR PLAN 3
- NPO for procedure with high finger sticks  - will change to sliding scale q6 while NPO  - please change back to sliding scale with meals and QHS once tolerating PO See above -- ASA 300mg, avoid FD anticoagulation, DVT ppx dose only  - recommend palliative c/s

## 2020-04-25 NOTE — PROGRESS NOTE ADULT - PROBLEM SELECTOR PROBLEM 4
Coronary artery disease due to calcified coronary lesion Type 2 diabetes mellitus without complication, unspecified whether long term insulin use

## 2020-04-25 NOTE — CHART NOTE - NSCHARTNOTEFT_GEN_A_CORE
CTH reviewed.    Large LMCA/MILIND acute infarcts.     - would hold therapeutic AC given risk of hemorrhagic conversion  - asa 300 suppository, chem DVT PPx  - Palliative consult    Given advanced age and comorbidities, chances of meaningful neurological recovery are very remote. Further neurovascular workup unlikely to .    d/w ortho resident. CTH reviewed.    Large LMCA/MILIND acute infarcts.     - would hold therapeutic AC given risk of hemorrhagic conversion  - asa 300 suppository, chem DVT PPx  - Palliative consult    Given advanced age and comorbidities, chances of meaningful neurological recovery are very remote. Further neurovascular workup unlikely to .    d/w ortho resident.    Attending addendum: PLan discussed with me and I agree CTH reviewed.    Large LMCA/MILIND acute infarcts.     - would hold therapeutic AC given risk of hemorrhagic conversion  - asa 300 suppository, chem DVT PPx  - Palliative consult    Given advanced age and comorbidities, chances of meaningful neurological recovery are very remote. Further neurovascular workup unlikely to  or outcome.    d/w ortho resident.    Attending addendum: PLan discussed with me and I agree

## 2020-04-25 NOTE — OCCUPATIONAL THERAPY INITIAL EVALUATION ADULT - VISUAL ASSESSMENT: TRACKING
Unable to accurately assess due to patient unable to follow commands. Patient noted with left gaze preference

## 2020-04-25 NOTE — PROGRESS NOTE ADULT - ASSESSMENT
89F sp fall 1 week ago with progressive deficits before admission for hip fracture    ·	Neuro: Pain control  ·	appreciate neuro consult: MRA/MR head/neck, palliative consult ordered  ·	Resp: IS  ·	Cards: appreciate cards consult  ·	FU Cards re medication now that patient NPO, currently rate controlled  ·	had preop TTE  ·	GI: NPO, Failed dysphagia screen, FU S&S(ord)  ·	MSK: WBAT, PT/OT  ·	Heme: DVT PPX    Ortho [id number]

## 2020-04-25 NOTE — OCCUPATIONAL THERAPY INITIAL EVALUATION ADULT - DIAGNOSIS, OT EVAL
s/p fall, s/p right hip ORIF, s/p left MILIND and MCA infarct; decreased functional mobility, decreased ADL performance, decreased sitting balance, inability to follow commands, right UE weakness

## 2020-04-25 NOTE — PROGRESS NOTE ADULT - ASSESSMENT
MAT  AFIB with RVR  now in sinus rhythm   cont cardizem change to PO as ordered   cont low dose BB as ordered   The calculated [id number] score is 7 , high risk for future cva, on a/c     Acute CVA  on heparin drip  repeat imaging as per neuro   change to Eliquis as once deemed safe     HTN  stable     DM type II  Monitor finger stick. Insulin coverage.   Hga1c 8.8  fu with Med

## 2020-04-25 NOTE — PROGRESS NOTE ADULT - PROBLEM SELECTOR PLAN 1
- s/p ORIF, ortho notes appreciated  - rehab consult in AM  - WBAT on RLE, OOB to chair in AM  - pain control with prn oxycodone per orders -- hold for sedation or RR <8 - s/p ORIF, ortho notes appreciated  - rehab consult appreciated -- given new CVA, unclear rehab potential at present time  - pain control with prn oxycodone per orders -- hold for sedation or RR <8

## 2020-04-25 NOTE — CHART NOTE - NSCHARTNOTEFT_GEN_A_CORE
Briefly, 89y/o RH Female PMHx Alzheimer's dementia (AAOx1-2 at baseline; doesn't know month), Former heavy smoker, HTN, prior Right parietal CVA (on Aspirin, Plavix), HLD, CAD, DM presented to [facility] on 4/23 s/p [facility] fall on Saturday (4/18) c/o severe R hip pain and inability to ambulate. Now POD #1 after R hip fixation. Neurology consulted for non-verbal mental status and decreased movement in RUE. Per daughter, patient has been non-verbal w/ right arm weakness since 4/22.   Neurologic exam remarkable for Awake, alert mental status. Non-verbal. Not following commands. L gaze deviation (does not correct), R nasolabial fold flattening, R hemiparesis.     NIHSS: 25  MRS: 3  LKN: 4/22/2020 @ 11:30AM  CTH (4/23) demonstrating no acute pathology.     Impression: Global aphasia a/w R hemiparesis likely 2/2 L brain dysfunction; suspect L MCA ischemic stroke; alternate consideration include embolic shower with primarily L Fronto-temporal lobe dysfunction 2/2 cryptogenic etiology; suspect cardioembolism    Recommendations:   [] Would repeat URGENT CTH non-contrast now (I've ordered this)  [] No obvious Neurologic contraindication to continuing A/C at this time however, would repeat imaging to r/o hemorrhage and to evaluate the size of presumed stroke as this may dictate when it would be relatively safer to resume A/C.   [] Rest recommendations as documented in Neurology consult note    Case discussed with Neurology Attending and Stroke Attending on call.     Please call us at [phone] when CTH is done/resulted. Briefly, 89y/o RH Female PMHx Alzheimer's dementia (AAOx1-2 at baseline; doesn't know month), Former heavy smoker, HTN, prior Right parietal CVA (on Aspirin, Plavix), HLD, CAD, DM presented to [facility] on 4/23 s/p [facility] fall on Saturday (4/18) c/o severe R hip pain and inability to ambulate. Now POD #1 after R hip fixation. Neurology consulted for non-verbal mental status and decreased movement in RUE. Per daughter, patient has been non-verbal w/ right arm weakness since 4/22.   Neurologic exam remarkable for Awake, alert mental status. Non-verbal. Not following commands. L gaze deviation (does not correct), R nasolabial fold flattening, R hemiparesis.     NIHSS: 25  MRS: 3  LKN: 4/22/2020 @ 11:30AM  CTH (4/23) demonstrating no acute pathology.     Impression: Global aphasia a/w R hemiparesis likely 2/2 L brain dysfunction; suspect L MCA ischemic stroke; alternate consideration include embolic shower with primarily L Fronto-temporal lobe dysfunction 2/2 cryptogenic etiology; suspect cardioembolism    Recommendations:   [] Would repeat URGENT CTH non-contrast now (I've ordered this)  [] No obvious Neurologic contraindication to continuing A/C at this time however, would repeat imaging to r/o hemorrhage and to evaluate the size of presumed stroke as this may dictate when it would be relatively safer to resume A/C.   [] Rest recommendations as documented in Neurology consult note    Case discussed with Neurology Attending and Stroke Attending on call.     Please call us at [phone] when CTH is done/resulted.    Attending addendum: Agree with this recommendation

## 2020-04-25 NOTE — PHYSICAL THERAPY INITIAL EVALUATION ADULT - PATIENT/FAMILY/SIGNIFICANT OTHER GOALS STATEMENT, PT EVAL
pt. non verbal and presenting with confusion throughout duration of session. Pt. unable to provide goal at this time

## 2020-04-25 NOTE — PROGRESS NOTE ADULT - SUBJECTIVE AND OBJECTIVE BOX
Ortho Progress Note    S: Patient seen and examined. No acute events overnight. Pain well controlled with current regimen. NPO    O:  Physical Exam:  Gen: Laying in bed, NAD, non-communicative  Resp: Unlabored breathing  RLE: EHL/FHL/TA/Sol intact          + SILT moves foot in response to stim          +DP, extremity WWP  Not moving RUE  Spontaneously moving LLE/LUE    Vital Signs Last 24 Hrs  T(C): 37.1 (25 Apr 2020 04:11), Max: 37.2 (24 Apr 2020 22:00)  T(F): 98.7 (25 Apr 2020 04:11), Max: 99 (24 Apr 2020 22:00)  HR: 85 (25 Apr 2020 04:11) (78 - 119)  BP: 143/76 (25 Apr 2020 04:11) (74/51 - 143/94)  BP(mean): --  RR: 16 (25 Apr 2020 04:11) (16 - 24)  SpO2: 100% (25 Apr 2020 04:11) (95% - 100%)                          10.1   16.05 )-----------( 255      ( 24 Apr 2020 16:11 )             30.9                         11.3   9.22  )-----------( 269      ( 24 Apr 2020 04:30 )             33.2       04-24    138  |  107  |  20  ----------------------------<  265<H>  3.8   |  14<L>  |  0.71        PT/INR - ( 24 Apr 2020 04:30 )   PT: 11.6 SEC;   INR: 1.02          PTT - ( 24 Apr 2020 04:30 )  PTT:23.1 SEC

## 2020-04-25 NOTE — PROGRESS NOTE ADULT - SUBJECTIVE AND OBJECTIVE BOX
Subjective: Patient seen and examined. No new events except as noted.     SUBJECTIVE/ROS:        MEDICATIONS:  MEDICATIONS  (STANDING):  atorvastatin 80 milliGRAM(s) Oral at bedtime  dextrose 5%. 1000 milliLiter(s) (50 mL/Hr) IV Continuous <Continuous>  dextrose 50% Injectable 12.5 Gram(s) IV Push once  dextrose 50% Injectable 25 Gram(s) IV Push once  dextrose 50% Injectable 25 Gram(s) IV Push once  diltiazem    Tablet 30 milliGRAM(s) Oral three times a day  famotidine    Tablet 20 milliGRAM(s) Oral two times a day  heparin  Infusion.  Unit(s)/Hr (10 mL/Hr) IV Continuous <Continuous>  insulin lispro (HumaLOG) corrective regimen sliding scale   SubCutaneous Before meals and at bedtime  propranolol 10 milliGRAM(s) Oral three times a day  senna 2 Tablet(s) Oral at bedtime  sodium chloride 0.9%. 1000 milliLiter(s) (125 mL/Hr) IV Continuous <Continuous>      PHYSICAL EXAM:  T(C): 37.1 (04-25-20 @ 04:11), Max: 37.2 (04-24-20 @ 22:00)  HR: 85 (04-25-20 @ 04:11) (78 - 119)  BP: 143/76 (04-25-20 @ 04:11) (74/51 - 143/94)  RR: 16 (04-25-20 @ 04:11) (16 - 24)  SpO2: 100% (04-25-20 @ 04:11) (95% - 100%)  Wt(kg): --  I&O's Summary    24 Apr 2020 07:01  -  25 Apr 2020 07:00  --------------------------------------------------------  IN: 2357.5 mL / OUT: 400 mL / NET: 1957.5 mL        Weight (kg): 51 (04-24 @ 15:20)      JVP: Normal  Neck: supple  Lung: clear   CV: S1 S2 , Murmur:  Abd: soft  Ext: No edema  neuro: Awake   Psych: flat affect  Skin: normal``    LABS/DATA:    CARDIAC MARKERS:                                9.2    10.03 )-----------( 234      ( 25 Apr 2020 07:00 )             28.3     04-25    139  |  109<H>  |  17  ----------------------------<  237<H>  3.8   |  14<L>  |  0.72    Ca    8.0<L>      25 Apr 2020 07:00    TPro  x   /  Alb  3.7  /  TBili  x   /  DBili  x   /  AST  x   /  ALT  x   /  AlkPhos  x   04-23    proBNP:   Lipid Profile:   HgA1c:   TSH:     TELE:  EKG:

## 2020-04-25 NOTE — PROGRESS NOTE ADULT - PROBLEM SELECTOR PLAN 7
- SQ Lovenox for now, start Eliquis in 24 hours if no bleeding for A fib A/C  - SCDs while not fully A/C  - transition to PO once anesthesia effects worn off -- DM2 diet resolved

## 2020-04-25 NOTE — PHYSICAL THERAPY INITIAL EVALUATION ADULT - ADDITIONAL COMMENTS
Pt. unable to provide social history, pt. nonverbal and presenting with confusion. Pt. unable to utilize  phone. as per documents, Pt. lives with family and was ambulating prior to admission. Please confirm with social work/case management for further social history    Spo2 remained above 90% on room air throughout duration. Pt. left supine in bed with all tubes/lines intact, call bell in reach and in NAD.

## 2020-04-25 NOTE — OCCUPATIONAL THERAPY INITIAL EVALUATION ADULT - MANUAL MUSCLE TESTING RESULTS, REHAB EVAL
Unable to accurately assess due to patient unable to follow commands. Patient noted to move left UE spontaneously, no spontaneous movement of right UE observed

## 2020-04-25 NOTE — PHYSICAL THERAPY INITIAL EVALUATION ADULT - PERTINENT HX OF CURRENT PROBLEM, REHAB EVAL
89 year Female PMH: dementia, HTN, CVA, HLD, CAD, DM presents s/p fall on Saturday, right hip fracture. S/p IM nailing

## 2020-04-25 NOTE — OCCUPATIONAL THERAPY INITIAL EVALUATION ADULT - PRECAUTIONS/LIMITATIONS, REHAB EVAL
aspiration precautions/fall precautions/isolation precautions/surgical precautions/[place]/CONTACT- R/O COVID-19

## 2020-04-25 NOTE — OCCUPATIONAL THERAPY INITIAL EVALUATION ADULT - LIVES WITH, PROFILE
Patient unable to provide social history. Per care coordination assessment, patient lives with family.

## 2020-04-25 NOTE — PROGRESS NOTE ADULT - ASSESSMENT
89 year old [place] Islander-speaking female patient w/ PMH of CAD, HTN, HLD, CVA on Plavix, DM2 -- BIBEMS from home for worsening mental status and found with R hip fracture s/p fall. Now s/p ORIF on 4/24. Also with A fib in RVR on admission, converted to SR with stabilized HR on cardizem gtt. 89 year old Icelandic-speaking female patient w/ PMH of CAD, HTN, HLD, CVA on Plavix, DM2 -- BIBEMS from home for worsening mental status and found with R hip fracture s/p fall. Now s/p ORIF on 4/24. Also with A fib in RVR on admission, converted to SR with stabilized HR on cardizem gtt. New L MILIND and MCA infarct on CT head today. Failed speech and swallow as well.

## 2020-04-25 NOTE — PROGRESS NOTE ADULT - PROBLEM SELECTOR PLAN 9
Overall prognosis poor, agree with palliative consult to help family ascertain GOC including DNR/DNI status, long term placement, feeding status.

## 2020-04-25 NOTE — OCCUPATIONAL THERAPY INITIAL EVALUATION ADULT - PERTINENT HX OF CURRENT PROBLEM, REHAB EVAL
89 year old female with history of dementia, HTN, CVA, HLD, CAD, DM presents s/p fall with c/p severe right hip pain and inability to ambulate. Per patient's daughter, patient also with presenting as nonverbal with right sided weakness since 4/22. Patient found to have right intertrochanteric fracture s/p right hip ORIF on 4/24/2020. CT brain shows acute left MILIND and MCA territory infarct.

## 2020-04-25 NOTE — PHYSICAL THERAPY INITIAL EVALUATION ADULT - LEVEL OF INDEPENDENCE: SIT/STAND, REHAB EVAL
further functional mobility not performed at this time secondary to pt. presenting with weakness and postural instability. will progress as appropriate.

## 2020-04-25 NOTE — OCCUPATIONAL THERAPY INITIAL EVALUATION ADULT - RANGE OF MOTION EXAMINATION, UPPER EXTREMITY
bilateral UE Passive ROM was WFL  (within functional limits)/+ muscle guarding noted during ROM assessment

## 2020-04-25 NOTE — PROGRESS NOTE ADULT - PROBLEM SELECTOR PLAN 2
Converted to SR, controlled HR on cardizem gtt 7.5mg/hr  - monitor HR, if stable in next 12 hours and tolerating PO intake, can transition to PO cardizem 60mg Q6 per current IV dose requirements. Hold for HR <60 or SBP <100. Give PO dose 1 hour prior to d/c of gtt.  - cardiology recommendations appreciated -- agree with Eliquis for A/C if no objections from ortho. Recommend to c/w DVT ppx Lovenox for now, if no bleeding in next 24 hours would start Eliquis at 5mg PO BID Converted to SR, controlled HR on cardizem gtt 7.5mg/hr, transitioned to PO but as unable to safely take PO, recommend to resume gtt as needed per cardiology recommendations   - no plan for FD A/c given concern for hemorrhagic conversion --  rectally, DVT ppx dose of A/C only

## 2020-04-25 NOTE — OCCUPATIONAL THERAPY INITIAL EVALUATION ADULT - PLANNED THERAPY INTERVENTIONS, OT EVAL
cognitive, visual perceptual/balance training/neuromuscular re-education/bed mobility training/ADL retraining/transfer training

## 2020-04-25 NOTE — CHART NOTE - NSCHARTNOTEFT_GEN_A_CORE
Spoke with neurology resident regarding L MCA stroke  Neurology recommends Palliative consult, and aspirin suppository. Okay for DVT prophylaxis with Lovenox but they would not recommend full AC due to high risk of bleeding. Overall poor prognosis. Spoke with neurology resident regarding L MCA stroke  Neurology recommends Palliative consult, and aspirin suppository. Okay for DVT prophylaxis with Lovenox but they would not recommend full AC due to high risk of bleeding. Does not recommend MRI. Overall poor prognosis. Spoke with neurology resident regarding L MCA stroke  Neurology recommendations include:  - Palliative consult   - Aspirin suppository  - Okay for DVT prophylaxis with Lovenox but they would not recommend full AC due to high risk of bleeding  - Does not recommend MRI  - Overall poor prognosis.

## 2020-04-25 NOTE — OCCUPATIONAL THERAPY INITIAL EVALUATION ADULT - GENERAL OBSERVATIONS, REHAB EVAL
Patient received semisupine in bed in NAD, awake but nonverbal. Unable to follow commands. + tele.  Per MICHAEL Palma, patient okay to participate in OT evaluation.

## 2020-04-25 NOTE — PROGRESS NOTE ADULT - PROBLEM SELECTOR PLAN 4
Previously on DAPT, off at present  - will ask cardiology to comment on use of platelet agents concomitantly with Eliquis - NPO as failed speech/swallow - start gentle hydration with D5 NS at 75/hr for now. Pending palliative d/w family, if they elect for PEG, can start NGT until PEG can be placed   - change to sliding scale q6 while NPO/on IVF

## 2020-04-26 NOTE — PROGRESS NOTE ADULT - PROBLEM SELECTOR PLAN 9
Overall prognosis poor, agree with palliative consult to help family ascertain GOC including DNR/DNI status, long term placement, feeding status. Overall prognosis poor, agree with palliative consult to help family ascertain GOC including code status, long term placement, nutrition plans. Palliative meeting planned for Monday 4/27. Granddaughter Dinorah Gogo (an RN) - [phone] -- is main point of contact

## 2020-04-26 NOTE — PROVIDER CONTACT NOTE (OTHER) - ACTION/TREATMENT ORDERED:
Will come assess tomorrow morning 4/27/2020. MD stated that this is a normal occurrence for post surgical patients and not a concern at this time. Continue to monitor and update if any changes.

## 2020-04-26 NOTE — PROGRESS NOTE ADULT - ASSESSMENT
89F w/ R IT fx s/p IMN POD 2. Patient found to have large L MCA infarct postoperatively     Pain control  Follow up Palliative Care recs regarding goals of care  Cards: appreciate cards consult, currently rate controlled on cardizem drip  Appreciate Neurology recs  NPO, Failed dysphagia screen, FU S&S(ord)  WBAT, PT/OT  DVT PPX: Lovenox and ASA suppository    Ortho [id number]

## 2020-04-26 NOTE — PROGRESS NOTE ADULT - SUBJECTIVE AND OBJECTIVE BOX
Ortho Progress Note    S: Patient seen and examined. CTH showing large ischemic MCA stroke. Patient failed speech and swallow, currently NPO.   Discussion was had with family regarding DNR status, nutrition, and goals of care. Family would like time to make decisions and is interested in speaking with palliative care.     O:  Physical Exam:  Gen: Laying in bed, NAD, non-communicative  Resp: Unlabored breathing  RLE: EHL/FHL/TA/Sol intact          + SILT moves foot in response to stim          +DP, extremity WWP  Not moving RUE  Spontaneously moving LLE/LUE       Vitals 24hrs  Vital Signs Last 24 Hrs  T(C): 36.3 (26 Apr 2020 04:34), Max: 37.3 (25 Apr 2020 22:53)  T(F): 97.4 (26 Apr 2020 04:34), Max: 99.1 (25 Apr 2020 22:53)  HR: 67 (25 Apr 2020 22:53) (67 - 89)  BP: 110/47 (26 Apr 2020 04:34) (110/47 - 146/86)  BP(mean): --  RR: 18 (26 Apr 2020 04:34) (17 - 19)  SpO2: 97% (26 Apr 2020 04:34) (96% - 99%)      04-24-20 @ 07:01  -  04-25-20 @ 07:00  --------------------------------------------------------  IN: 2357.5 mL / OUT: 400 mL / NET: 1957.5 mL        Lab Results 24hrs:                        9.2    10.03 )-----------( 234      ( 25 Apr 2020 07:00 )             28.3     04-25    139  |  109<H>  |  17  ----------------------------<  237<H>  3.8   |  14<L>  |  0.72    Ca    8.0<L>      25 Apr 2020 07:00

## 2020-04-26 NOTE — PROGRESS NOTE ADULT - SUBJECTIVE AND OBJECTIVE BOX
Subjective: Patient seen and examined. No new events except as noted.     SUBJECTIVE/ROS:        MEDICATIONS:  MEDICATIONS  (STANDING):  aspirin Suppository 300 milliGRAM(s) Rectal daily  atorvastatin 80 milliGRAM(s) Oral at bedtime  dextrose 5% + sodium chloride 0.45%. 1000 milliLiter(s) (50 mL/Hr) IV Continuous <Continuous>  dextrose 5%. 1000 milliLiter(s) (50 mL/Hr) IV Continuous <Continuous>  dextrose 50% Injectable 12.5 Gram(s) IV Push once  dextrose 50% Injectable 25 Gram(s) IV Push once  dextrose 50% Injectable 25 Gram(s) IV Push once  diltiazem Infusion 7.5 mG/Hr (7.5 mL/Hr) IV Continuous <Continuous>  enoxaparin Injectable 40 milliGRAM(s) SubCutaneous daily  famotidine    Tablet 20 milliGRAM(s) Oral two times a day  insulin lispro (HumaLOG) corrective regimen sliding scale   SubCutaneous Before meals and at bedtime  senna 2 Tablet(s) Oral at bedtime  sodium chloride 0.9%. 1000 milliLiter(s) (125 mL/Hr) IV Continuous <Continuous>      PHYSICAL EXAM:  T(C): 36.7 (04-26-20 @ 12:10), Max: 37.3 (04-25-20 @ 22:53)  HR: 96 (04-26-20 @ 12:10) (67 - 99)  BP: 132/70 (04-26-20 @ 12:10) (110/47 - 140/84)  RR: 19 (04-26-20 @ 12:10) (18 - 19)  SpO2: 98% (04-26-20 @ 12:10) (96% - 99%)  Wt(kg): --  I&O's Summary    25 Apr 2020 07:01  -  26 Apr 2020 07:00  --------------------------------------------------------  IN: 0 mL / OUT: 200 mL / NET: -200 mL            JVP: Normal  Neck: supple  Lung: clear   CV: S1 S2 , Murmur:  Abd: soft  Ext: No edema  neuro: Awake  Psych: flat affect  Skin: normal``    LABS/DATA:    CARDIAC MARKERS:                                8.8    9.71  )-----------( 231      ( 26 Apr 2020 05:22 )             26.3     04-26    140  |  109<H>  |  14  ----------------------------<  288<H>  3.0<L>   |  16<L>  |  0.71    Ca    8.4      26 Apr 2020 05:22      proBNP:   Lipid Profile:   HgA1c:   TSH:     TELE:  EKG:

## 2020-04-26 NOTE — SWALLOW BEDSIDE ASSESSMENT ADULT - ADDITIONAL RECOMMENDATIONS
Reconsult if/when patient's mental/medical status improves to reassess for candidacy of an oral diet.

## 2020-04-26 NOTE — PROGRESS NOTE ADULT - PROBLEM SELECTOR PLAN 2
Converted to SR, controlled HR on cardizem gtt 7.5mg/hr, transitioned to PO but as unable to safely take PO, recommend to resume gtt as needed per cardiology recommendations   - no plan for FD A/c given concern for hemorrhagic conversion --  rectally, DVT ppx dose of A/C only Converted to SR, controlled HR on cardizem gtt 7.5mg/hr, transitioned to PO but as unable to safely take PO, resumed gtt with stable HR at present  - no plan for FD A/c given concern for hemorrhagic conversion --  rectally, DVT ppx dose of A/C only

## 2020-04-26 NOTE — PROVIDER CONTACT NOTE (OTHER) - BACKGROUND
Patient was admitted on 4/23/2020 due to Displaced intertrochanteric fracture of right femur. Patient had surgery on 4/24.

## 2020-04-26 NOTE — PROVIDER CONTACT NOTE (OTHER) - ASSESSMENT
VS stable. No active signs of Bleeding noted. Minimal to moderate sanguinous leakage noted. No acute signs of distress noted. Peripheral pulses palpable. Bilateral lower extremity color pink.

## 2020-04-26 NOTE — PROVIDER CONTACT NOTE (OTHER) - ACTION/TREATMENT ORDERED:
Contacted MD Cortes. Per MD cont. to monitor dressing, stated during rounds will change dressing. Abdominal pad applied to reinforce dressing. Will continue to monitor for active signs of Bleeding.

## 2020-04-26 NOTE — PROGRESS NOTE ADULT - PROBLEM SELECTOR PLAN 3
See above -- ASA 300mg, avoid FD anticoagulation, DVT ppx dose only  - recommend palliative c/s See above -- ASA 300mg, avoid FD anticoagulation, DVT ppx dose only  - no other intervention planned

## 2020-04-26 NOTE — PROGRESS NOTE ADULT - PROBLEM SELECTOR PLAN 4
- NPO as failed speech/swallow - start gentle hydration with D5 NS at 75/hr for now. Pending palliative d/w family, if they elect for PEG, can start NGT until PEG can be placed   - change to sliding scale q6 while NPO/on IVF - NPO as failed speech/swallow -c/w gentle hydration with D5 NS at 75/hr for now. Pending palliative d/w family, if they elect for PEG, can start OGT until PEG can be placed   - please change to sliding scale q6 while NPO/on IVF as FS high

## 2020-04-26 NOTE — SWALLOW BEDSIDE ASSESSMENT ADULT - SWALLOW EVAL: DIAGNOSIS
Patient is presented with ice chip presentations to increase level of alertness and provide oral stimulation to increase level of oral awareness for PO Trials.   Patient makes no attempt to lick/suck on ice chip presentation that were placed onto the upper/lower lips and anterior oral cavity tongue surface exacerbated by reduced mentation/cognitive state. The Oral and Pharyngeal stage could not be assessed given Patient's Clinical Presentation.

## 2020-04-26 NOTE — PROVIDER CONTACT NOTE (OTHER) - RECOMMENDATIONS
Contact orthopedic surgeon Sebastian to come assess and change dressing. Apply abdominal pad for reinforcement.

## 2020-04-26 NOTE — SWALLOW BEDSIDE ASSESSMENT ADULT - COMMENTS
Ortho Note 4/26/2020 - 89F w/ R IT fx s/p IMN POD 2. Patient found to have large L MCA infarct postoperatively.     Hospitalist Note 4/26/2020 - 89 year old Amharic-speaking female patient w/ PMH of CAD, HTN, HLD, CVA on Plavix, DM2 -- BIBEMS from home for worsening mental status and found with R hip fracture s/p fall. Now s/p ORIF on 4/24. Also with A fib in RVR on admission, converted to SR with stabilized HR on cardizem gtt. New L MILIND and MCA infarct on CT head today. Failed speech and swallow as well.    Patient seen at bedside, lethargic state. Patient is given verbal/tactile/noxious stimuli to increase level of alertness. Patient exhibiting facial grimacing only to the noxious stimuli. Ortho Note 4/26/2020 - 89F w/ R IT fx s/p IMN POD 2. Patient found to have large L MCA infarct postoperatively.     Hospitalist Note 4/26/2020 - 89 year old [place]ish-speaking female patient w/ PMH of CAD, HTN, HLD, CVA on Plavix, DM2 -- BIBEMS from home for worsening mental status and found with R hip fracture s/p fall. Now s/p ORIF on 4/24. Also with A fib in RVR on admission, converted to SR with stabilized HR on cardizem gtt. New L MILIND and MCA infarct on CT head today. Failed speech and swallow as well.    Patient seen at bedside, lethargic state. Patient is given verbal/tactile/noxious stimuli to increase level of alertness. Patient exhibiting facial grimacing with intermittent eyes open state to the noxious stimuli but unable to maintain level of alert state.

## 2020-04-26 NOTE — SWALLOW BEDSIDE ASSESSMENT ADULT - SWALLOW EVAL: RECOMMENDED DIET
Consider NPO with Consideration for Short Term Non Oral Means of Nutrition. Suggest discussion with Family regarding Plan of Care for Nutritional Goals of Care given Guarded Prognosis at MD's discretion.

## 2020-04-26 NOTE — PROGRESS NOTE ADULT - SUBJECTIVE AND OBJECTIVE BOX
Medicine Progress note    Patient is a 89y old  Female who presents with a chief complaint of R hip fx (26 Apr 2020 05:26)    SUBJECTIVE / OVERNIGHT EVENTS: Increased drainage from surgical dressing this morning, ortho addressing. Continued elevated FS levels. Family currently deciding GOC, will be discussing with palliative.     MEDICATIONS  (STANDING):  aspirin Suppository 300 milliGRAM(s) Rectal daily  atorvastatin 80 milliGRAM(s) Oral at bedtime  dextrose 5% + sodium chloride 0.45%. 1000 milliLiter(s) (50 mL/Hr) IV Continuous <Continuous>  dextrose 5%. 1000 milliLiter(s) (50 mL/Hr) IV Continuous <Continuous>  dextrose 50% Injectable 12.5 Gram(s) IV Push once  dextrose 50% Injectable 25 Gram(s) IV Push once  dextrose 50% Injectable 25 Gram(s) IV Push once  diltiazem Infusion 7.5 mG/Hr (7.5 mL/Hr) IV Continuous <Continuous>  enoxaparin Injectable 40 milliGRAM(s) SubCutaneous daily  famotidine    Tablet 20 milliGRAM(s) Oral two times a day  insulin lispro (HumaLOG) corrective regimen sliding scale   SubCutaneous Before meals and at bedtime  potassium chloride  10 mEq/100 mL IVPB 10 milliEquivalent(s) IV Intermittent every 1 hour  senna 2 Tablet(s) Oral at bedtime  sodium chloride 0.9%. 1000 milliLiter(s) (125 mL/Hr) IV Continuous <Continuous>    MEDICATIONS  (PRN):  acetaminophen  IVPB .. 1000 milliGRAM(s) IV Intermittent every 8 hours PRN Severe Pain (7 - 10)  dextrose 40% Gel 15 Gram(s) Oral once PRN Blood Glucose LESS THAN 70 milliGRAM(s)/deciliter  glucagon  Injectable 1 milliGRAM(s) IntraMuscular once PRN Glucose LESS THAN 70 milligrams/deciliter  oxyCODONE    IR 2.5 milliGRAM(s) Oral every 4 hours PRN Moderate Pain (4 - 6)  oxyCODONE    IR 5 milliGRAM(s) Oral every 4 hours PRN Severe Pain (7 - 10)      CAPILLARY BLOOD GLUCOSE    POCT Blood Glucose.: 320 mg/dL (26 Apr 2020 07:55)  POCT Blood Glucose.: 275 mg/dL (26 Apr 2020 06:10)  POCT Blood Glucose.: 330 mg/dL (26 Apr 2020 00:30)  POCT Blood Glucose.: 289 mg/dL (25 Apr 2020 16:59)  POCT Blood Glucose.: 299 mg/dL (25 Apr 2020 11:57)    I&O's Summary    25 Apr 2020 07:01  -  26 Apr 2020 07:00  --------------------------------------------------------  IN: 0 mL / OUT: 200 mL / NET: -200 mL    PHYSICAL EXAM:  Vital Signs Last 24 Hrs  T(C): 36.4 (26 Apr 2020 09:50), Max: 37.3 (25 Apr 2020 22:53)  T(F): 97.6 (26 Apr 2020 09:50), Max: 99.1 (25 Apr 2020 22:53)  HR: 98 (26 Apr 2020 09:50) (67 - 99)  BP: 112/58 (26 Apr 2020 09:50) (110/47 - 140/84)  BP(mean): 80 (26 Apr 2020 09:50) (80 - 80)  RR: 18 (26 Apr 2020 09:50) (18 - 19)  SpO2: 97% (26 Apr 2020 09:50) (96% - 99%)    CONSTITUTIONAL: NAD, eyes open but minimally responsive   RESPIRATORY: Normal respiratory effort  CARDIOVASCULAR: Regular rate and rhythm, No lower extremity edema  ABDOMEN: Nontender to palpation, normoactive bowel sounds, no rebound/guarding  Neuro: unable to assess as pt not following commands   SKIN: No rashes; no palpable lesions, + eccymoses over R leg and dressings at hip/pelvis area c/d/i    LABS:                        8.8    9.71  )-----------( 231      ( 26 Apr 2020 05:22 )             26.3     04-26    140  |  109<H>  |  14  ----------------------------<  288<H>  3.0<L>   |  16<L>  |  0.71    Ca    8.4      26 Apr 2020 05:22      PT/INR - ( 25 Apr 2020 07:00 )   PT: 12.4 SEC;   INR: 1.08          PTT - ( 25 Apr 2020 07:00 )  PTT:22.8 SEC    COVID-19 PCR: NotDetec (04-23-20 @ 17:28)    RADIOLOGY & ADDITIONAL TESTS:  Imaging from Last 24 Hours:    Case discussed with: Ortho Medicine Progress note    Patient is a 89y old  Female who presents with a chief complaint of R hip fx (26 Apr 2020 05:26)    SUBJECTIVE / OVERNIGHT EVENTS: Increased drainage from surgical dressing this morning, ortho addressing. Continued elevated FS levels. Family currently deciding GOC, will be discussing with palliative tomorrow. Pt seen/examined, no responsive to verbal stimuli or commands. Appears comfortable.     MEDICATIONS  (STANDING):  aspirin Suppository 300 milliGRAM(s) Rectal daily  atorvastatin 80 milliGRAM(s) Oral at bedtime  dextrose 5% + sodium chloride 0.45%. 1000 milliLiter(s) (50 mL/Hr) IV Continuous <Continuous>  dextrose 5%. 1000 milliLiter(s) (50 mL/Hr) IV Continuous <Continuous>  dextrose 50% Injectable 12.5 Gram(s) IV Push once  dextrose 50% Injectable 25 Gram(s) IV Push once  dextrose 50% Injectable 25 Gram(s) IV Push once  diltiazem Infusion 7.5 mG/Hr (7.5 mL/Hr) IV Continuous <Continuous>  enoxaparin Injectable 40 milliGRAM(s) SubCutaneous daily  famotidine    Tablet 20 milliGRAM(s) Oral two times a day  insulin lispro (HumaLOG) corrective regimen sliding scale   SubCutaneous Before meals and at bedtime  potassium chloride  10 mEq/100 mL IVPB 10 milliEquivalent(s) IV Intermittent every 1 hour  senna 2 Tablet(s) Oral at bedtime  sodium chloride 0.9%. 1000 milliLiter(s) (125 mL/Hr) IV Continuous <Continuous>    MEDICATIONS  (PRN):  acetaminophen  IVPB .. 1000 milliGRAM(s) IV Intermittent every 8 hours PRN Severe Pain (7 - 10)  dextrose 40% Gel 15 Gram(s) Oral once PRN Blood Glucose LESS THAN 70 milliGRAM(s)/deciliter  glucagon  Injectable 1 milliGRAM(s) IntraMuscular once PRN Glucose LESS THAN 70 milligrams/deciliter  oxyCODONE    IR 2.5 milliGRAM(s) Oral every 4 hours PRN Moderate Pain (4 - 6)  oxyCODONE    IR 5 milliGRAM(s) Oral every 4 hours PRN Severe Pain (7 - 10)      CAPILLARY BLOOD GLUCOSE    POCT Blood Glucose.: 320 mg/dL (26 Apr 2020 07:55)  POCT Blood Glucose.: 275 mg/dL (26 Apr 2020 06:10)  POCT Blood Glucose.: 330 mg/dL (26 Apr 2020 00:30)  POCT Blood Glucose.: 289 mg/dL (25 Apr 2020 16:59)  POCT Blood Glucose.: 299 mg/dL (25 Apr 2020 11:57)    I&O's Summary    25 Apr 2020 07:01  -  26 Apr 2020 07:00  --------------------------------------------------------  IN: 0 mL / OUT: 200 mL / NET: -200 mL    PHYSICAL EXAM:  Vital Signs Last 24 Hrs  T(C): 36.4 (26 Apr 2020 09:50), Max: 37.3 (25 Apr 2020 22:53)  T(F): 97.6 (26 Apr 2020 09:50), Max: 99.1 (25 Apr 2020 22:53)  HR: 98 (26 Apr 2020 09:50) (67 - 99)  BP: 112/58 (26 Apr 2020 09:50) (110/47 - 140/84)  BP(mean): 80 (26 Apr 2020 09:50) (80 - 80)  RR: 18 (26 Apr 2020 09:50) (18 - 19)  SpO2: 97% (26 Apr 2020 09:50) (96% - 99%)    CONSTITUTIONAL: NAD, eyes open but minimally responsive   RESPIRATORY: Normal respiratory effort  CARDIOVASCULAR: Regular rate and rhythm, No significant lower extremity edema  ABDOMEN: Nontender to palpation, normoactive bowel sounds, no rebound/guarding  Neuro: unable to assess as pt not following commands   SKIN: No rashes; no palpable lesions, + eccymoses over R leg and dressings at hip/pelvis area c/i but + saturated     LABS:                        8.8    9.71  )-----------( 231      ( 26 Apr 2020 05:22 )             26.3     04-26    140  |  109<H>  |  14  ----------------------------<  288<H>  3.0<L>   |  16<L>  |  0.71    Ca    8.4      26 Apr 2020 05:22      PT/INR - ( 25 Apr 2020 07:00 )   PT: 12.4 SEC;   INR: 1.08        PTT - ( 25 Apr 2020 07:00 )  PTT:22.8 SEC    COVID-19 PCR: Javier (04-23-20 @ 17:28)    RADIOLOGY & ADDITIONAL TESTS:  Imaging from Last 24 Hours:    Case discussed with: Ortho

## 2020-04-26 NOTE — PROGRESS NOTE ADULT - PROBLEM SELECTOR PLAN 1
- s/p ORIF, ortho notes appreciated  - rehab consult appreciated -- given new CVA, unclear rehab potential at present time  - pain control with prn oxycodone per orders -- hold for sedation or RR <8

## 2020-04-26 NOTE — PROGRESS NOTE ADULT - ASSESSMENT
MAT  AFIB with RVR  now in sinus rhythm   cont cardizem infusion as pt failed S/S  The calculated [id number] score is 7 , A/C is recommended once deemed safe     Acute CVA  fu with neurology   a/c once deemed safe     HTN  stable     DM type II  Monitor finger stick. Insulin coverage.   Hga1c 8.8  fu with Med

## 2020-04-26 NOTE — PROGRESS NOTE ADULT - ASSESSMENT
89 year old Mongolian-speaking female patient w/ PMH of CAD, HTN, HLD, CVA on Plavix, DM2 -- BIBEMS from home for worsening mental status and found with R hip fracture s/p fall. Now s/p ORIF on 4/24. Also with A fib in RVR on admission, converted to SR with stabilized HR on cardizem gtt. New L MILIND and MCA infarct on CT head today. Failed speech and swallow as well. 89 year old Telugu-speaking female patient w/ PMH of CAD, HTN, HLD, CVA on Plavix, DM2 -- BIBEMS from home for worsening mental status and found with R hip fracture s/p fall. Now s/p ORIF on 4/24. Also with A fib in RVR on admission, converted to SR with stabilized HR on cardizem gtt. New L MILIND and MCA infarct on CT head 4/25. Failed speech and swallow.

## 2020-04-27 NOTE — CHART NOTE - NSCHARTNOTEFT_GEN_A_CORE
Potassium of 2.6 repleted this morning. Patient remains rate controlled on telemetry. Will repeat BMP this afternoon.  Patient failed S&S eval and is unable to provide PO nutrition. She has been NPO for several days and is malnourished.   Palliative care consult pending to evaluate this morning and discuss with family GOC and options for nutrition including need for PEG.  Will follow up with palliative regarding discussion with family this morning. Potassium of 2.6 repleted this morning. Patient remains rate controlled on telemetry. Will repeat BMP this afternoon.  Patient failed S&S eval and is unable to provide PO nutrition. She has been NPO for several days and is malnourished.   Palliative care consult pending to evaluate this morning and discuss with family GOC and options for nutrition including need for PEG.  Will follow up with palliative regarding discussion with family this morning.      SAMANTHA veliz above

## 2020-04-27 NOTE — CONSULT NOTE ADULT - PROBLEM SELECTOR RECOMMENDATION 2
CTH showing large LMCA/MILIND infarction in the setting of afib.  Guarded prongosis, discussed with daughter.   Now AOx0, failed S&S, AMS

## 2020-04-27 NOTE — PROGRESS NOTE ADULT - ASSESSMENT
89F sp fall 1 week ago with progressive deficits before admission for hip fracture    ·	Neuro: Pain control  ·	appreciate neuro consult: palliative consult ordered  ·	Resp: IS  ·	Cards: appreciate cards consult  ·	FU Cards re medication now that patient NPO, currently rate controlled  ·	had preop TTE  ·	GI: NPO, Failed dysphagia screen, failed S&S  ·	MSK: WBAT, PT/OT  ·	Heme: DVT PPX    Ortho [id number]

## 2020-04-27 NOTE — PROGRESS NOTE ADULT - SUBJECTIVE AND OBJECTIVE BOX
Medicine Progress note    Patient is a 89y old  Female who presents with a chief complaint of R hip fx (26 Apr 2020 05:26)    SUBJECTIVE / OVERNIGHT EVENTS: Pt seen/examined by me   Awake,non verbal.  Not responding  responsive to verbal stimuli or commands. Appears comfortable.     MEDICATIONS  (STANDING):  aspirin Suppository 300 milliGRAM(s) Rectal daily  atorvastatin 80 milliGRAM(s) Oral at bedtime  dextrose 5% + sodium chloride 0.45%. 1000 milliLiter(s) (50 mL/Hr) IV Continuous <Continuous>  dextrose 5%. 1000 milliLiter(s) (50 mL/Hr) IV Continuous <Continuous>  dextrose 50% Injectable 12.5 Gram(s) IV Push once  dextrose 50% Injectable 25 Gram(s) IV Push once  dextrose 50% Injectable 25 Gram(s) IV Push once  diltiazem Infusion 7.5 mG/Hr (7.5 mL/Hr) IV Continuous <Continuous>  enoxaparin Injectable 40 milliGRAM(s) SubCutaneous daily  famotidine    Tablet 20 milliGRAM(s) Oral two times a day  insulin lispro (HumaLOG) corrective regimen sliding scale   SubCutaneous every 6 hours  senna 2 Tablet(s) Oral at bedtime    MEDICATIONS  (PRN):  acetaminophen  IVPB .. 1000 milliGRAM(s) IV Intermittent every 8 hours PRN Severe Pain (7 - 10)  dextrose 40% Gel 15 Gram(s) Oral once PRN Blood Glucose LESS THAN 70 milliGRAM(s)/deciliter  glucagon  Injectable 1 milliGRAM(s) IntraMuscular once PRN Glucose LESS THAN 70 milligrams/deciliter  oxyCODONE    IR 2.5 milliGRAM(s) Oral every 4 hours PRN Moderate Pain (4 - 6)  oxyCODONE    IR 5 milliGRAM(s) Oral every 4 hours PRN Severe Pain (7 - 10)      CAPILLARY BLOOD GLUCOSE    POCT Blood Glucose.: 295 mg/dL (27 Apr 2020 12:14)  POCT Blood Glucose.: 293 mg/dL (27 Apr 2020 05:58)  POCT Blood Glucose.: 309 mg/dL (27 Apr 2020 00:50)  POCT Blood Glucose.: 237 mg/dL (26 Apr 2020 17:28)    I&O's Summary    25 Apr 2020 07:01  -  26 Apr 2020 07:00  --------------------------------------------------------  IN: 0 mL / OUT: 200 mL / NET: -200 mL    PHYSICAL EXAM:  Vital Signs Last 24 Hrs  T(C): 36.4 (26 Apr 2020 09:50), Max: 37.3 (25 Apr 2020 22:53)  T(F): 97.6 (26 Apr 2020 09:50), Max: 99.1 (25 Apr 2020 22:53)  HR: 98 (26 Apr 2020 09:50) (67 - 99)  BP: 112/58 (26 Apr 2020 09:50) (110/47 - 140/84)  BP(mean): 80 (26 Apr 2020 09:50) (80 - 80)  RR: 18 (26 Apr 2020 09:50) (18 - 19)  SpO2: 97% (26 Apr 2020 09:50) (96% - 99%)    CONSTITUTIONAL: NAD, eyes open but minimally responsive   RESPIRATORY: Normal respiratory effort  CARDIOVASCULAR: Regular rate and rhythm, No significant lower extremity edema  ABDOMEN: Nontender to palpation, normoactive bowel sounds, no rebound/guarding  Neuro: unable to assess as pt not following commands , Right arm- drops on lifting , left arm- pt provides resistance   SKIN: No rashes; no palpable lesions,  EXTREMTIES- Dressing present at surgical site, + ecchymoses over R leg    LABS:                                            8.8    9.50  )-----------( 263      ( 27 Apr 2020 06:55 )             26.4     04-27    138  |  106  |  10  ----------------------------<  299<H>  2.6<LL>   |  19<L>  |  0.58    Ca    7.9<L>      27 Apr 2020 06:55        COVID-19 PCR: NotDetec (04-23-20 @ 17:28)    RADIOLOGY & ADDITIONAL TESTS:  Imaging : CT head from 4/25Acute left MILIND and MCA territory infarct involving the left frontal and parietal lobes. No dmitriy hemorrhagic transformation.  Chronic right frontoparietal infarct.      Case discussed with: Ortho

## 2020-04-27 NOTE — PROGRESS NOTE ADULT - PROBLEM SELECTOR PLAN 9
Reviewed Palliative note from 4/27-  Per discussions with daughter Flora, pts family does not consider a good quality of life given pts MS. They want to bring pt home to be with loved ones, recommended comfort care approach and home with hospice services.   [facility] Palliative attending Dr Mari- Plan for follow meeting  tomorrow 10:30 AM with pts family.   Granddaughter Dinorah Gogo (an RN) - [phone] -- is main point of contact

## 2020-04-27 NOTE — PROGRESS NOTE ADULT - PROBLEM SELECTOR PLAN 4
A1C- 8.4  NPO as failed speech/swallow   On gentle hydration with D5 NS at 50/hr   Will dc D5 as BS elevated   On  sliding scale q6 while NPO/on IVF as FS high

## 2020-04-27 NOTE — PROGRESS NOTE ADULT - ASSESSMENT
89 year old Setswana-speaking female patient w/ PMH of CAD, HTN, HLD, CVA on Plavix, DM2 -- BIBEMS from home for worsening mental status and found with R hip fracture s/p fall. Now s/p ORIF on 4/24. Also with A fib in RVR on admission, converted to SR with stabilized HR on cardizem gtt. New L MILIND and MCA infarct on CT head 4/25. Failed speech and swallow.      Per discussions with daughter Flora, pts family does not consider a good quality of life given pts MS. They want to bring pt home to be with loved ones, recommended comfort care approach and home with hospice services.  They will discuss amongst themselves, follow up scheduled for tomorrow 10:30 AM with pts family.

## 2020-04-27 NOTE — PROGRESS NOTE ADULT - ASSESSMENT
89y/o RH Female PMHx Alzheimer's dementia (AAOx1-2 at baseline; doesn't know month), Former heavy smoker, HTN, prior Right parietal CVA (on Aspirin, Plavix), HLD, CAD, [facility] c/o severe R hip pain and inability to ambulate. S/p R hip surgery. On 4/22 noted speech arrest and dysphagia. Also found to be in Afib w/ RVR. CTH showing large LMCA/MILIND infarction.     [] Palliative consult pending  [] repeat CTH today  [] given high risk of recurrent stroke from AFIB, will likely consider to AC, pending repeat imaging and if OK from ortho service  [] c/w ASA supp 300 for now ( if and when starting AC, no need for antiplatelet agent from neurovascular stand point)      prognosis overall would be grim and ideally should focus on optimizing comfort 89y/o RH Female PMHx Alzheimer's dementia (AAOx1-2 at baseline; doesn't know month), Former heavy smoker, HTN, prior Right parietal CVA (on Aspirin, Plavix), HLD, CAD, [facility]/o severe R hip pain and inability to ambulate. S/p R hip surgery. On 4/22 noted speech arrest and dysphagia. Also found to be in Afib w/ RVR. CTH showing large LMCA/MILIND infarction.     [] Palliative consult  - would focus on optimizing pt comfort  [] repeat CTH today  [] hold AC, given large infarct  [] c/w ASA supp 300 for now ( if and when starting AC, no need for antiplatelet agent from neurovascular stand point)    prognosis overall would be grim and ideally should focus on optimizing comfort, further neurovascular w/u unlikely to  or outcome.    d/w Dr. Verma 89y/o RH Female PMHx Alzheimer's dementia (AAOx1-2 at baseline; doesn't know month), Former heavy smoker, HTN, prior Right parietal CVA (on Aspirin, Plavix), HLD, CAD, [facility] c/o severe R hip pain and inability to ambulate. S/p R hip surgery. On 4/22 noted speech arrest and dysphagia. Also found to be in Afib w/ RVR. CTH showing large LMCA/MILIND infarction.     [] Palliative consult  - would focus on optimizing pt comfort  [] hold AC, given large infarct - can consider resumption in 1-2 weeks after repeat CTH if in line with family GOC  [] c/w ASA supp 300 for now ( if and when starting AC, no need for antiplatelet agent from neurovascular stand point)    prognosis overall would be grim and ideally should focus on optimizing comfort, further neurovascular w/u unlikely to  or outcome.    d/w Dr. Verma

## 2020-04-27 NOTE — PROGRESS NOTE ADULT - PROBLEM SELECTOR PLAN 1
Acute L MILIND and MCA infarct  with aphasia/Dysphagia   Suspect embolic due to Afib   Appreciate Neuro rec- hold AC, given large infarct, can consider resumption in 1-2 weeks after repeat CTH if in line with family GOC  On  ASA 300mg, avoid FD anticoagulation, DVT ppx dose only  As per Palliative note from 4/27 - recommended comfort care approach and home with hospice services.

## 2020-04-27 NOTE — PROGRESS NOTE ADULT - PROBLEM SELECTOR PLAN 3
Converted to SR  controlled HR on cardizem gtt 7.5mg/hr  Was  transitioned to PO but as unable to safely take PO, resumed gtt with stable HR at present  - no plan for FD A/c given concern for hemorrhagic conversion --  rectally, DVT ppx dose of A/C only

## 2020-04-27 NOTE — CONSULT NOTE ADULT - ASSESSMENT
89y Female PMHx dementia, HTN, CVA (on aspirin, Plavix), HLD, CAD, DM [facility] severe R hip pain and inability to ambulate.  S/p Surgical repair of hip fracture (4/24) c/b CVA post-op, failed S&S. Palliative Care consulted for complex decision making in the setting of dementia and new found CVA.

## 2020-04-27 NOTE — PROVIDER CONTACT NOTE (OTHER) - ASSESSMENT
Patient lying comfortably in bed, no signs/symptoms of distress noted. All other vitals stable. Patient currently on cardizem drip @ 7.5 ml/hr

## 2020-04-27 NOTE — CONSULT NOTE ADULT - PROBLEM SELECTOR PROBLEM 4
Type 2 diabetes mellitus without complication, unspecified whether long term insulin use
Palliative care by specialist
[Negative] : Genitourinary

## 2020-04-27 NOTE — PROGRESS NOTE ADULT - PROBLEM SELECTOR PLAN 2
s/p ORIF  continue care as per Ortho   Rehab consult appreciated -- given new CVA, unclear rehab potential at present time  Pt ordered for pain control with prn oxycodone- But pt NPO  and not received any PRNS  Appears comfortable-

## 2020-04-27 NOTE — PROGRESS NOTE ADULT - SUBJECTIVE AND OBJECTIVE BOX
VASCULAR NEUROLOGY - STROKE SERVICE - PROGRESS NOTE      PARTH PARRY  Female  MRN-[id number]    Subjective:       VITAL SIGNS:  Vital Signs Last 24 Hrs  T(C): 36.7 (27 Apr 2020 05:42), Max: 36.7 (26 Apr 2020 12:10)  T(F): 98.1 (27 Apr 2020 05:42), Max: 98.1 (27 Apr 2020 05:42)  HR: 96 (26 Apr 2020 12:10) (96 - 96)  BP: 141/71 (27 Apr 2020 05:42) (132/70 - 141/71)  BP(mean): 91 (26 Apr 2020 12:10) (91 - 91)  RR: 17 (27 Apr 2020 05:42) (17 - 19)  SpO2: 100% (27 Apr 2020 05:42) (98% - 100%)    PHYSICAL EXAMINATION:        LABS:                          8.8    9.50  )-----------( 263      ( 27 Apr 2020 06:55 )             26.4     04-27    138  |  106  |  10  ----------------------------<  299<H>  2.6<LL>   |  19<L>  |  0.58    Ca    7.9<L>      27 Apr 2020 06:55            RADIOLOGY & ADDITIONAL STUDIES:    < from: CT Head No Cont (04.25.20 @ 14:31) >  IMPRESSION:    Acute left MILIND and MCA territory infarct involving the left frontal and parietal lobes. No dmitriy hemorrhagic transformation.  Chronic right frontoparietal infarct.    < end of copied text > VASCULAR NEUROLOGY - STROKE SERVICE - PROGRESS NOTE      Subjective: PT seen and examined.      VITAL SIGNS:  Vital Signs Last 24 Hrs  T(C): 36.7 (27 Apr 2020 05:42), Max: 36.7 (26 Apr 2020 12:10)  T(F): 98.1 (27 Apr 2020 05:42), Max: 98.1 (27 Apr 2020 05:42)  HR: 96 (26 Apr 2020 12:10) (96 - 96)  BP: 141/71 (27 Apr 2020 05:42) (132/70 - 141/71)  BP(mean): 91 (26 Apr 2020 12:10) (91 - 91)  RR: 17 (27 Apr 2020 05:42) (17 - 19)  SpO2: 100% (27 Apr 2020 05:42) (98% - 100%)    PHYSICAL EXAMINATION:   Awake, L gaze preference, flaccid R hemiplegia, globally aphasic.  grimaces to pain on L      LABS:                          8.8    9.50  )-----------( 263      ( 27 Apr 2020 06:55 )             26.4     04-27    138  |  106  |  10  ----------------------------<  299<H>  2.6<LL>   |  19<L>  |  0.58    Ca    7.9<L>      27 Apr 2020 06:55            RADIOLOGY & ADDITIONAL STUDIES:    < from: CT Head No Cont (04.25.20 @ 14:31) >  IMPRESSION:    Acute left MILIND and MCA territory infarct involving the left frontal and parietal lobes. No dmitriy hemorrhagic transformation.  Chronic right frontoparietal infarct.    < end of copied text > VASCULAR NEUROLOGY - STROKE SERVICE - PROGRESS NOTE      Subjective: PT seen and examined. L gaze preference, awake, globally aphasic, R hemiplegia      VITAL SIGNS:  Vital Signs Last 24 Hrs  T(C): 36.7 (27 Apr 2020 05:42), Max: 36.7 (26 Apr 2020 12:10)  T(F): 98.1 (27 Apr 2020 05:42), Max: 98.1 (27 Apr 2020 05:42)  HR: 96 (26 Apr 2020 12:10) (96 - 96)  BP: 141/71 (27 Apr 2020 05:42) (132/70 - 141/71)  BP(mean): 91 (26 Apr 2020 12:10) (91 - 91)  RR: 17 (27 Apr 2020 05:42) (17 - 19)  SpO2: 100% (27 Apr 2020 05:42) (98% - 100%)    PHYSICAL EXAMINATION:   Awake, L gaze preference, flaccid R hemiplegia, globally aphasic.  grimaces to pain on L      LABS:                          8.8    9.50  )-----------( 263      ( 27 Apr 2020 06:55 )             26.4     04-27    138  |  106  |  10  ----------------------------<  299<H>  2.6<LL>   |  19<L>  |  0.58    Ca    7.9<L>      27 Apr 2020 06:55            RADIOLOGY & ADDITIONAL STUDIES:    < from: CT Head No Cont (04.25.20 @ 14:31) >  IMPRESSION:    Acute left MILIND and MCA territory infarct involving the left frontal and parietal lobes. No dmitriy hemorrhagic transformation.  Chronic right frontoparietal infarct.    < end of copied text >

## 2020-04-27 NOTE — CONSULT NOTE ADULT - PROBLEM SELECTOR RECOMMENDATION 9
Likely 2/2 CVA  Patient also with h/o dementia, per family functional able to do all her ADLs.  Per discussions with daughter, they are leaning towards no PEG/artificial nutrition.

## 2020-04-27 NOTE — CONSULT NOTE ADULT - SUBJECTIVE AND OBJECTIVE BOX
HPI:  89y Female PMHx dementia, HTN, CVA (on aspirin, Plavix), HLD, CAD, DM presents s/p mech fall on Saturday c/o severe R hip pain and inability to ambulate.  Patient's daughter endorses head strike but no LOC. Patient denies numbness/tingling/burning in the RLE. No other bone/joint complaints. Patient is a community ambulator at baseline with assistive devices. Patient was in Afib RVR  in ED.    PAST MEDICAL & SURGICAL HISTORY:  Hypertension  CVA (cerebral vascular accident)  Hyperlipemia  CAD (coronary artery disease)  Diabetes    MEDICATIONS  (STANDING):  acetaminophen   Tablet .. 975 milliGRAM(s) Oral every 8 hours  amLODIPine   Tablet 10 milliGRAM(s) Oral daily  famotidine    Tablet 20 milliGRAM(s) Oral two times a day  lisinopril 40 milliGRAM(s) Oral daily  senna 2 Tablet(s) Oral at bedtime  sodium chloride 0.9%. 1000 milliLiter(s) (125 mL/Hr) IV Continuous <Continuous>    MEDICATIONS  (PRN):  oxyCODONE    IR 2.5 milliGRAM(s) Oral every 4 hours PRN Moderate Pain (4 - 6)  oxyCODONE    IR 5 milliGRAM(s) Oral every 4 hours PRN Severe Pain (7 - 10)    Allergies    No Known Allergies    Intolerances                          11.6   10.64 )-----------( 290      ( 23 Apr 2020 11:45 )             35.3     04-23    133<L>  |  99  |  29<H>  ----------------------------<  335<H>  3.6   |  19<L>  |  0.73    Ca    9.0      23 Apr 2020 11:45    TPro  7.0  /  Alb  3.8  /  TBili  0.7  /  DBili  x   /  AST  14  /  ALT  13  /  AlkPhos  100  04-23    PT/INR - ( 23 Apr 2020 11:45 )   PT: 11.7 SEC;   INR: 1.03          PTT - ( 23 Apr 2020 11:45 )  PTT:24.1 SEC    T(C): 36.6 (04-23-20 @ 13:53), Max: 36.7 (04-23-20 @ 10:46)  HR: 107 (04-23-20 @ 13:53) (97 - 107)  BP: 146/76 (04-23-20 @ 13:53) (126/74 - 146/76)  RR: 24 (04-23-20 @ 13:53) (18 - 24)  SpO2: 100% (04-23-20 @ 13:53) (97% - 100%)  Wt(kg): --    PE   RLE:  Skin intact; No ecchymosis/soft tissue swelling  Compartments soft; + TTP about hip. No TTP to knee/leg/ankle/foot   ROM lmited 2/2 pain   Unable to SLR; + Log Roll/Heel Strike  Motor intact GS/TA/FHL/EHL  SILT L2-S1  DP/PT pulses 2+    LLE/BUE:   No bony TTP; Good ROM w/o pain; Exam Unremarkable    Imaging:  XR demonstrating R intertrochanteric fracture    89y Female with R intertroch fracture  - Tele monitoring  - Pain control  - NPO/IVF at midnight  - CBC/BMP/Coags/UA/T+S x2  - EKG/CXR  - Medical clearance  - Plan for OR for ORIF in AM (23 Apr 2020 14:58)    PERTINENT PM/SXH:   Hypertension  CVA (cerebral vascular accident)  Hyperlipemia  CAD (coronary artery disease)  Diabetes  Hypotension    FAMILY HISTORY:    ITEMS NOT CHECKED ARE NOT PRESENT    SOCIAL HISTORY:   Significant other/partner:  [ ]  Children:  [x ]  Sabianist/Spirituality:  Substance hx:  [ ]   Tobacco hx:  [ ]   Alcohol hx: [ ]   Home Opioid hx:  [ ] I-Stop Reference No:  Living Situation: [ x]Home  [ ]Long term care  [ ]Rehab [ ]Other    ADVANCE DIRECTIVES:    DNR  MOLST  [ ]  Living Will  [ ]   DECISION MAKER(s):  [ ] Health Care Proxy(s)  [x ] Surrogate(s)  [ ] Guardian           Name(s): Phone Number(s):  Daughter - Viktoria Camargo #[phone]    BASELINE (I)ADL(s) (prior to admission):  [place]: [ ]Total  [x ] Moderate [ ]Dependent    Allergies    No Known Allergies    Intolerances    MEDICATIONS  (STANDING):  aspirin Suppository 300 milliGRAM(s) Rectal daily  atorvastatin 80 milliGRAM(s) Oral at bedtime  dextrose 5% + sodium chloride 0.45%. 1000 milliLiter(s) (50 mL/Hr) IV Continuous <Continuous>  dextrose 5%. 1000 milliLiter(s) (50 mL/Hr) IV Continuous <Continuous>  dextrose 50% Injectable 12.5 Gram(s) IV Push once  dextrose 50% Injectable 25 Gram(s) IV Push once  dextrose 50% Injectable 25 Gram(s) IV Push once  diltiazem Infusion 7.5 mG/Hr (7.5 mL/Hr) IV Continuous <Continuous>  enoxaparin Injectable 40 milliGRAM(s) SubCutaneous daily  famotidine    Tablet 20 milliGRAM(s) Oral two times a day  insulin lispro (HumaLOG) corrective regimen sliding scale   SubCutaneous every 6 hours  potassium chloride  10 mEq/100 mL IVPB 10 milliEquivalent(s) IV Intermittent every 1 hour  senna 2 Tablet(s) Oral at bedtime    MEDICATIONS  (PRN):  acetaminophen  IVPB .. 1000 milliGRAM(s) IV Intermittent every 8 hours PRN Severe Pain (7 - 10)  dextrose 40% Gel 15 Gram(s) Oral once PRN Blood Glucose LESS THAN 70 milliGRAM(s)/deciliter  glucagon  Injectable 1 milliGRAM(s) IntraMuscular once PRN Glucose LESS THAN 70 milligrams/deciliter  oxyCODONE    IR 2.5 milliGRAM(s) Oral every 4 hours PRN Moderate Pain (4 - 6)  oxyCODONE    IR 5 milliGRAM(s) Oral every 4 hours PRN Severe Pain (7 - 10)    PRESENT SYMPTOMS: [ ]Unable to obtain due to poor mentation   Source if other than patient:  [ ]Family   [ ]Team     Pain (Impact on QOL):    Location -         Minimal acceptable level (0-10 scale):                    Aggravating factors -  Quality -  Radiation -  Severity (0-10 scale) -    Timing -    PAIN AD Score:     [email or web address].pdf (press ctrl +  left click to view)    Dyspnea:                           [ ]Mild [ ]Moderate [ ]Severe  Anxiety:                             [ ]Mild [ ]Moderate [ ]Severe  Fatigue:                             [ ]Mild [ ]Moderate [ ]Severe  Nausea:                             [ ]Mild [ ]Moderate [ ]Severe  Loss of appetite:              [ ]Mild [ ]Moderate [ ]Severe  Constipation:                    [ ]Mild [ ]Moderate [ ]Severe    Other Symptoms:  [ ]All other review of systems negative     Karnofsky Performance Score/Palliative Performance Status Version 2:         %    http://palliative.info/resource_material/PPSv2.pdf    PHYSICAL EXAM:  Vital Signs Last 24 Hrs  T(C): 36.7 (27 Apr 2020 05:42), Max: 36.7 (26 Apr 2020 12:10)  T(F): 98.1 (27 Apr 2020 05:42), Max: 98.1 (27 Apr 2020 05:42)  HR: 96 (26 Apr 2020 12:10) (96 - 96)  BP: 141/71 (27 Apr 2020 05:42) (132/70 - 141/71)  BP(mean): 91 (26 Apr 2020 12:10) (91 - 91)  RR: 17 (27 Apr 2020 05:42) (17 - 19)  SpO2: 100% (27 Apr 2020 05:42) (98% - 100%) I&O's Summary    26 Apr 2020 07:01  -  27 Apr 2020 07:00  --------------------------------------------------------  IN: 0 mL / OUT: 1720 mL / NET: -1720 mL    27 Apr 2020 07:01  -  27 Apr 2020 10:19  --------------------------------------------------------  IN: 0 mL / OUT: 350 mL / NET: -350 mL    GENERAL:  [ ]Alert  [ ]Oriented x   [ ]Lethargic  [ ]Cachexia  [ ]Unarousable  [ ]Verbal  [ ]Non-Verbal  Behavioral:   [ ] Anxiety  [ ] Delirium [ ] Agitation [ ] Other  HEENT:  [ ]Normal   [ ]Dry mouth   [ ]ET Tube/Trach  [ ]Oral lesions  PULMONARY:   [ ]Clear [ ]Tachypnea  [ ]Audible excessive secretions   [ ]Rhonchi        [ ]Right [ ]Left [ ]Bilateral  [ ]Crackles        [ ]Right [ ]Left [ ]Bilateral  [ ]Wheezing     [ ]Right [ ]Left [ ]Bilateral  CARDIOVASCULAR:    [ ]Regular [ ]Irregular [ ]Tachy  [ ]Varghese [ ]Murmur [ ]Other  GASTROINTESTINAL:  [ ]Soft  [ ]Distended   [ ]+BS  [ ]Non tender [ ]Tender  [ ]PEG [ ]OGT/ NGT  Last BM:   GENITOURINARY:  [ ]Normal [ ] Incontinent   [ ]Oliguria/Anuria   [ ]Euceda  MUSCULOSKELETAL:   [ ]Normal   [ ]Weakness  [ ]Bed/Wheelchair bound [ ]Edema  NEUROLOGIC:   [ ]No focal deficits  [ ] Cognitive impairment  [ ] Dysphagia [ ]Dysarthria [ ] Paresis [ ]Other   SKIN:   [ ]Normal   [ ]Pressure ulcer(s)  [ ]Rash    CRITICAL CARE:  [ ] Shock Present  [ ]Septic [ ]Cardiogenic [ ]Neurologic [ ]Hypovolemic  [ ]  Vasopressors [ ]  Inotropes   [ ] Respiratory failure present  [ ] Acute  [ ] Chronic [ ] Hypoxic  [ ] Hypercarbic [ ] Other  [ ] Other organ failure     GRIEF  [ ] Yes  [ ] No    LABS:                        8.8    9.50  )-----------( 263      ( 27 Apr 2020 06:55 )             26.4   04-27    138  |  106  |  10  ----------------------------<  299<H>  2.6<LL>   |  19<L>  |  0.58    Ca    7.9<L>      27 Apr 2020 06:55    RADIOLOGY & ADDITIONAL STUDIES:     < from: Xray Hip 1 View, Right (04.23.20 @ 14:41) >  IMPRESSION:  Redemonstrated proximal right femoral intertrochanteric fracture. Decreased varus angulation with applied traction residual offset medial cortical margins.    Preserved right hip joint space.    Generalized osteopenia. No discrete lytic or blastic lesions.    < from: CT Head No Cont (04.25.20 @ 14:31) >    IMPRESSION:    Acute left MILIND and MCA territory infarct involving the left frontal and parietal lobes. No dmitriy hemorrhagic transformation.  Chronic right frontoparietal infarct.    PROTEIN CALORIE MALNUTRITION PRESENT: [ ] Yes [ ] No  [ ] PPSV2 < or = to 30% [ ] significant weight loss  [ ] poor nutritional intake [ ] catabolic state [ ] anasarca     Albumin, Serum: 3.7 g/dL (04-23-20 @ 16:28)  Artificial Nutrition [ ]     REFERRALS:   [ ]Chaplaincy  [ ] Hospice  [ ]Child Life  [ ]Social Work  [ ]Case management [ ]Holistic Therapy   Goals of Care Discussion Document: HPI:  89y Female PMHx dementia, HTN, CVA (on aspirin, Plavix), HLD, CAD, DM presents s/p mech fall on Saturday c/o severe R hip pain and inability to ambulate.  Patient's daughter endorses head strike but no LOC. Patient denies numbness/tingling/burning in the RLE. No other bone/joint complaints. Patient is a community ambulator at baseline with assistive devices. Patient was in Afib RVR  in ED. (23 Apr 2020 14:58)    PERTINENT PM/SXH:   Hypertension  CVA (cerebral vascular accident)  Hyperlipemia  CAD (coronary artery disease)  Diabetes  Hypotension    FAMILY HISTORY:    ITEMS NOT CHECKED ARE NOT PRESENT    SOCIAL HISTORY:   Significant other/partner:  [ ]  Children:  [x ]  Oriental orthodox/Spirituality:  Substance hx:  [ ]   Tobacco hx:  [ ]   Alcohol hx: [ ]   Home Opioid hx:  [ ] I-Stop Reference No:  Living Situation: [ x]Home  [ ]Long term care  [ ]Rehab [ ]Other    ADVANCE DIRECTIVES:    DNR  MOLST  [ ]  Living Will  [ ]   DECISION MAKER(s):  [ ] Health Care Proxy(s)  [x ] Surrogate(s)  [ ] Guardian           Name(s): Phone Number(s):  Daughter - Viktoria Camargo #[phone]    BASELINE (I)ADL(s) (prior to admission):  [place]: [ ]Total  [x ] Moderate [ ]Dependent    Allergies    No Known Allergies    Intolerances    MEDICATIONS  (STANDING):  aspirin Suppository 300 milliGRAM(s) Rectal daily  atorvastatin 80 milliGRAM(s) Oral at bedtime  dextrose 5% + sodium chloride 0.45%. 1000 milliLiter(s) (50 mL/Hr) IV Continuous <Continuous>  dextrose 5%. 1000 milliLiter(s) (50 mL/Hr) IV Continuous <Continuous>  dextrose 50% Injectable 12.5 Gram(s) IV Push once  dextrose 50% Injectable 25 Gram(s) IV Push once  dextrose 50% Injectable 25 Gram(s) IV Push once  diltiazem Infusion 7.5 mG/Hr (7.5 mL/Hr) IV Continuous <Continuous>  enoxaparin Injectable 40 milliGRAM(s) SubCutaneous daily  famotidine    Tablet 20 milliGRAM(s) Oral two times a day  insulin lispro (HumaLOG) corrective regimen sliding scale   SubCutaneous every 6 hours  potassium chloride  10 mEq/100 mL IVPB 10 milliEquivalent(s) IV Intermittent every 1 hour  senna 2 Tablet(s) Oral at bedtime    MEDICATIONS  (PRN):  acetaminophen  IVPB .. 1000 milliGRAM(s) IV Intermittent every 8 hours PRN Severe Pain (7 - 10)  dextrose 40% Gel 15 Gram(s) Oral once PRN Blood Glucose LESS THAN 70 milliGRAM(s)/deciliter  glucagon  Injectable 1 milliGRAM(s) IntraMuscular once PRN Glucose LESS THAN 70 milligrams/deciliter  oxyCODONE    IR 2.5 milliGRAM(s) Oral every 4 hours PRN Moderate Pain (4 - 6)  oxyCODONE    IR 5 milliGRAM(s) Oral every 4 hours PRN Severe Pain (7 - 10)    PRESENT SYMPTOMS: [ ]Unable to obtain due to poor mentation   Source if other than patient:  [ ]Family   [ ]Team     Pain (Impact on QOL):    Location -         Minimal acceptable level (0-10 scale):                    Aggravating factors -  Quality -  Radiation -  Severity (0-10 scale) -    Timing -    PAIN AD Score:     [email or web address] (press ctrl +  left click to view)    Dyspnea:                           [ ]Mild [ ]Moderate [ ]Severe  Anxiety:                             [ ]Mild [ ]Moderate [ ]Severe  Fatigue:                             [ ]Mild [ ]Moderate [ ]Severe  Nausea:                             [ ]Mild [ ]Moderate [ ]Severe  Loss of appetite:              [ ]Mild [ ]Moderate [ ]Severe  Constipation:                    [ ]Mild [ ]Moderate [ ]Severe    Other Symptoms:  [ ]All other review of systems negative     Karnofsky Performance Score/Palliative Performance Status Version 2:         %    http://palliative.info/resource_material/PPSv2.pdf    PHYSICAL EXAM:  Vital Signs Last 24 Hrs  T(C): 36.7 (27 Apr 2020 05:42), Max: 36.7 (26 Apr 2020 12:10)  T(F): 98.1 (27 Apr 2020 05:42), Max: 98.1 (27 Apr 2020 05:42)  HR: 96 (26 Apr 2020 12:10) (96 - 96)  BP: 141/71 (27 Apr 2020 05:42) (132/70 - 141/71)  BP(mean): 91 (26 Apr 2020 12:10) (91 - 91)  RR: 17 (27 Apr 2020 05:42) (17 - 19)  SpO2: 100% (27 Apr 2020 05:42) (98% - 100%) I&O's Summary    26 Apr 2020 07:01  -  27 Apr 2020 07:00  --------------------------------------------------------  IN: 0 mL / OUT: 1720 mL / NET: -1720 mL    27 Apr 2020 07:01  -  27 Apr 2020 10:19  --------------------------------------------------------  IN: 0 mL / OUT: 350 mL / NET: -350 mL    GENERAL:  [ ]Alert  [ ]Oriented x   [ ]Lethargic  [ ]Cachexia  [ ]Unarousable  [ ]Verbal  [ ]Non-Verbal  Behavioral:   [ ] Anxiety  [ ] Delirium [ ] Agitation [ ] Other  HEENT:  [ ]Normal   [ ]Dry mouth   [ ]ET Tube/Trach  [ ]Oral lesions  PULMONARY:   [ ]Clear [ ]Tachypnea  [ ]Audible excessive secretions   [ ]Rhonchi        [ ]Right [ ]Left [ ]Bilateral  [ ]Crackles        [ ]Right [ ]Left [ ]Bilateral  [ ]Wheezing     [ ]Right [ ]Left [ ]Bilateral  CARDIOVASCULAR:    [ ]Regular [ ]Irregular [ ]Tachy  [ ]Varghese [ ]Murmur [ ]Other  GASTROINTESTINAL:  [ ]Soft  [ ]Distended   [ ]+BS  [ ]Non tender [ ]Tender  [ ]PEG [ ]OGT/ NGT  Last BM:   GENITOURINARY:  [ ]Normal [ ] Incontinent   [ ]Oliguria/Anuria   [ ]Euceda  MUSCULOSKELETAL:   [ ]Normal   [ ]Weakness  [ ]Bed/Wheelchair bound [ ]Edema  NEUROLOGIC:   [ ]No focal deficits  [ ] Cognitive impairment  [ ] Dysphagia [ ]Dysarthria [ ] Paresis [ ]Other   SKIN:   [ ]Normal   [ ]Pressure ulcer(s)  [ ]Rash    CRITICAL CARE:  [ ] Shock Present  [ ]Septic [ ]Cardiogenic [ ]Neurologic [ ]Hypovolemic  [ ]  Vasopressors [ ]  Inotropes   [ ] Respiratory failure present  [ ] Acute  [ ] Chronic [ ] Hypoxic  [ ] Hypercarbic [ ] Other  [ ] Other organ failure     GRIEF  [ ] Yes  [ ] No    LABS:                        8.8    9.50  )-----------( 263      ( 27 Apr 2020 06:55 )             26.4   04-27    138  |  106  |  10  ----------------------------<  299<H>  2.6<LL>   |  19<L>  |  0.58    Ca    7.9<L>      27 Apr 2020 06:55    RADIOLOGY & ADDITIONAL STUDIES:     < from: Xray Hip 1 View, Right (04.23.20 @ 14:41) >  IMPRESSION:  Redemonstrated proximal right femoral intertrochanteric fracture. Decreased varus angulation with applied traction residual offset medial cortical margins.    Preserved right hip joint space.    Generalized osteopenia. No discrete lytic or blastic lesions.    < from: CT Head No Cont (04.25.20 @ 14:31) >    IMPRESSION:    Acute left MILIND and MCA territory infarct involving the left frontal and parietal lobes. No dmitriy hemorrhagic transformation.  Chronic right frontoparietal infarct.    PROTEIN CALORIE MALNUTRITION PRESENT: [ ] Yes [ ] No  [ ] PPSV2 < or = to 30% [ ] significant weight loss  [ ] poor nutritional intake [ ] catabolic state [ ] anasarca     Albumin, Serum: 3.7 g/dL (04-23-20 @ 16:28)  Artificial Nutrition [ ]     REFERRALS:   [ ]Chaplaincy  [ ] Hospice  [ ]Child Life  [ ]Social Work  [ ]Case management [ ]Holistic Therapy   Goals of Care Discussion Document: HPI:  89y Female PMHx dementia, HTN, CVA (on aspirin, Plavix), HLD, CAD, DM presents s/p mech fall on Saturday c/o severe R hip pain and inability to ambulate.  Patient's daughter endorses head strike but no LOC. Patient denies numbness/tingling/burning in the RLE. No other bone/joint complaints. Patient is a community ambulator at baseline with assistive devices. Patient was in Afib RVR  in ED. (23 Apr 2020 14:58)    PERTINENT PM/SXH:   Hypertension  CVA (cerebral vascular accident)  Hyperlipemia  CAD (coronary artery disease)  Diabetes  Hypotension    FAMILY HISTORY:    ITEMS NOT CHECKED ARE NOT PRESENT    SOCIAL HISTORY:   Significant other/partner:  [ ]  Children:  [x ]  Caodaism/Spirituality:  Substance hx:  [ ]   Tobacco hx:  [ ]   Alcohol hx: [ ]   Home Opioid hx:  [ ] I-Stop Reference No:  Living Situation: [ x]Home  [ ]Long term care  [ ]Rehab [ ]Other    ADVANCE DIRECTIVES:    DNR  MOLST  [ ]  Living Will  [ ]   DECISION MAKER(s):  [ ] Health Care Proxy(s)  [x ] Surrogate(s)  [ ] Guardian           Name(s): Phone Number(s):  Daughter - Flora Byrnes #[phone]    BASELINE (I)ADL(s) (prior to admission):  [place]: [x ]Total  [ ] Moderate [ ]Dependent    Allergies    No Known Allergies    Intolerances    MEDICATIONS  (STANDING):  aspirin Suppository 300 milliGRAM(s) Rectal daily  atorvastatin 80 milliGRAM(s) Oral at bedtime  dextrose 5% + sodium chloride 0.45%. 1000 milliLiter(s) (50 mL/Hr) IV Continuous <Continuous>  dextrose 5%. 1000 milliLiter(s) (50 mL/Hr) IV Continuous <Continuous>  dextrose 50% Injectable 12.5 Gram(s) IV Push once  dextrose 50% Injectable 25 Gram(s) IV Push once  dextrose 50% Injectable 25 Gram(s) IV Push once  diltiazem Infusion 7.5 mG/Hr (7.5 mL/Hr) IV Continuous <Continuous>  enoxaparin Injectable 40 milliGRAM(s) SubCutaneous daily  famotidine    Tablet 20 milliGRAM(s) Oral two times a day  insulin lispro (HumaLOG) corrective regimen sliding scale   SubCutaneous every 6 hours  potassium chloride  10 mEq/100 mL IVPB 10 milliEquivalent(s) IV Intermittent every 1 hour  senna 2 Tablet(s) Oral at bedtime    MEDICATIONS  (PRN):  acetaminophen  IVPB .. 1000 milliGRAM(s) IV Intermittent every 8 hours PRN Severe Pain (7 - 10)  dextrose 40% Gel 15 Gram(s) Oral once PRN Blood Glucose LESS THAN 70 milliGRAM(s)/deciliter  glucagon  Injectable 1 milliGRAM(s) IntraMuscular once PRN Glucose LESS THAN 70 milligrams/deciliter  oxyCODONE    IR 2.5 milliGRAM(s) Oral every 4 hours PRN Moderate Pain (4 - 6)  oxyCODONE    IR 5 milliGRAM(s) Oral every 4 hours PRN Severe Pain (7 - 10)    PRESENT SYMPTOMS: [x ]Unable to obtain due to poor mentation   Source if other than patient:  [ ]Family   [ ]Team     Pain (Impact on QOL):    Location -         Minimal acceptable level (0-10 scale):                    Aggravating factors -  Quality -  Radiation -  Severity (0-10 scale) -    Timing -    PAIN AD Score:     [email or web address] (press ctrl +  left click to view)    Dyspnea:                           [ ]Mild [ ]Moderate [ ]Severe  Anxiety:                             [ ]Mild [ ]Moderate [ ]Severe  Fatigue:                             [ ]Mild [ ]Moderate [ ]Severe  Nausea:                             [ ]Mild [ ]Moderate [ ]Severe  Loss of appetite:              [ ]Mild [ ]Moderate [ ]Severe  Constipation:                    [ ]Mild [ ]Moderate [ ]Severe    Other Symptoms:  [ ]All other review of systems negative     Karnofsky Performance Score/Palliative Performance Status Version 2: 30 %    http://palliative.info/resource_material/PPSv2.pdf    PHYSICAL EXAM:  Vital Signs Last 24 Hrs  T(C): 36.7 (27 Apr 2020 05:42), Max: 36.7 (26 Apr 2020 12:10)  T(F): 98.1 (27 Apr 2020 05:42), Max: 98.1 (27 Apr 2020 05:42)  HR: 96 (26 Apr 2020 12:10) (96 - 96)  BP: 141/71 (27 Apr 2020 05:42) (132/70 - 141/71)  BP(mean): 91 (26 Apr 2020 12:10) (91 - 91)  RR: 17 (27 Apr 2020 05:42) (17 - 19)  SpO2: 100% (27 Apr 2020 05:42) (98% - 100%) I&O's Summary    26 Apr 2020 07:01  -  27 Apr 2020 07:00  --------------------------------------------------------  IN: 0 mL / OUT: 1720 mL / NET: -1720 mL    27 Apr 2020 07:01  -  27 Apr 2020 10:19  --------------------------------------------------------  IN: 0 mL / OUT: 350 mL / NET: -350 mL    GENERAL:  [ ]Alert  [ ]Oriented x   [x ]Lethargic  [ ]Cachexia  [ ]Unarousable  [ ]Verbal  [ x]Non-Verbal  Behavioral:   [ ] Anxiety  [ ] Delirium [ ] Agitation [ ] Other  HEENT:  [ ]Normal   [x ]Dry mouth   [ ]ET Tube/Trach  [ ]Oral lesions  PULMONARY:   [x ]Clear [ ]Tachypnea  [ ]Audible excessive secretions   [ ]Rhonchi        [ ]Right [ ]Left [ ]Bilateral  [ ]Crackles        [ ]Right [ ]Left [ ]Bilateral  [ ]Wheezing     [ ]Right [ ]Left [ ]Bilateral  CARDIOVASCULAR:    [ ]Regular [ x]Irregular [ ]Tachy  [ ]Varghese [ ]Murmur [ ]Other  GASTROINTESTINAL:  [x ]Soft  [ ]Distended   [x]+BS  [ x]Non tender [ ]Tender  [ ]PEG [ ]OGT/ NGT  Last BM: 5/25  GENITOURINARY:  [ ]Normal [x ] Incontinent   [ ]Oliguria/Anuria   [ ]Euceda  MUSCULOSKELETAL:   [ ]Normal   [ ]Weakness  [ x]Bed/Wheelchair bound [ ]Edema  NEUROLOGIC:   [ ]No focal deficits  [x ] Cognitive impairment  [ x] Dysphagia [ ]Dysarthria [ ] Paresis [ ]Other   SKIN:   [x ]Normal   [ ]Pressure ulcer(s)  [ ]Rash    CRITICAL CARE:  [ ] Shock Present  [ ]Septic [ ]Cardiogenic [ ]Neurologic [ ]Hypovolemic  [ ]  Vasopressors [ ]  Inotropes   [ ] Respiratory failure present  [ ] Acute  [ ] Chronic [ ] Hypoxic  [ ] Hypercarbic [ ] Other  [ ] Other organ failure     GRIEF  [x ] Yes  [ ] No    LABS:                        8.8    9.50  )-----------( 263      ( 27 Apr 2020 06:55 )             26.4   04-27    138  |  106  |  10  ----------------------------<  299<H>  2.6<LL>   |  19<L>  |  0.58    Ca    7.9<L>      27 Apr 2020 06:55    RADIOLOGY & ADDITIONAL STUDIES:     < from: Xray Hip 1 View, Right (04.23.20 @ 14:41) >  IMPRESSION:  Redemonstrated proximal right femoral intertrochanteric fracture. Decreased varus angulation with applied traction residual offset medial cortical margins.    Preserved right hip joint space.    Generalized osteopenia. No discrete lytic or blastic lesions.    < from: CT Head No Cont (04.25.20 @ 14:31) >    IMPRESSION:    Acute left MILIND and MCA territory infarct involving the left frontal and parietal lobes. No dmitriy hemorrhagic transformation.  Chronic right frontoparietal infarct.    PROTEIN CALORIE MALNUTRITION PRESENT: [ ] Yes [ ] No  [ x] PPSV2 < or = to 30% [ ] significant weight loss  [ ] poor nutritional intake [ ] catabolic state [ ] anasarca     Albumin, Serum: 3.7 g/dL (04-23-20 @ 16:28)  Artificial Nutrition [ ]     REFERRALS:   [ ]Chaplaincy  [ ] Hospice  [ ]Child Life  [ ]Social Work  [ ]Case management [ ]Holistic Therapy   Goals of Care Discussion Document:

## 2020-04-27 NOTE — PROGRESS NOTE ADULT - SUBJECTIVE AND OBJECTIVE BOX
Subjective: Patient seen and examined. No new events except as noted.     SUBJECTIVE/ROS:        MEDICATIONS:  MEDICATIONS  (STANDING):  aspirin Suppository 300 milliGRAM(s) Rectal daily  atorvastatin 80 milliGRAM(s) Oral at bedtime  dextrose 5% + sodium chloride 0.45%. 1000 milliLiter(s) (50 mL/Hr) IV Continuous <Continuous>  dextrose 5%. 1000 milliLiter(s) (50 mL/Hr) IV Continuous <Continuous>  dextrose 50% Injectable 12.5 Gram(s) IV Push once  dextrose 50% Injectable 25 Gram(s) IV Push once  dextrose 50% Injectable 25 Gram(s) IV Push once  diltiazem Infusion 7.5 mG/Hr (7.5 mL/Hr) IV Continuous <Continuous>  enoxaparin Injectable 40 milliGRAM(s) SubCutaneous daily  famotidine    Tablet 20 milliGRAM(s) Oral two times a day  insulin lispro (HumaLOG) corrective regimen sliding scale   SubCutaneous every 6 hours  potassium chloride  10 mEq/100 mL IVPB 10 milliEquivalent(s) IV Intermittent every 1 hour  senna 2 Tablet(s) Oral at bedtime      PHYSICAL EXAM:  T(C): 36.7 (04-27-20 @ 05:42), Max: 36.7 (04-26-20 @ 12:10)  HR: 96 (04-26-20 @ 12:10) (96 - 98)  BP: 141/71 (04-27-20 @ 05:42) (112/58 - 141/71)  RR: 17 (04-27-20 @ 05:42) (17 - 19)  SpO2: 100% (04-27-20 @ 05:42) (97% - 100%)  Wt(kg): --  I&O's Summary    26 Apr 2020 07:01  -  27 Apr 2020 07:00  --------------------------------------------------------  IN: 0 mL / OUT: 1720 mL / NET: -1720 mL            JVP: Normal  Neck: supple  Lung: clear   CV: S1 S2 , Murmur:  Abd: soft  Ext: No edema  neuro: Awake   Psych: flat affect  Skin: normal``    LABS/DATA:    CARDIAC MARKERS:                                8.8    9.50  )-----------( 263      ( 27 Apr 2020 06:55 )             26.4     04-27    138  |  106  |  10  ----------------------------<  299<H>  2.6<LL>   |  19<L>  |  0.58    Ca    7.9<L>      27 Apr 2020 06:55      proBNP:   Lipid Profile:   HgA1c:   TSH:     TELE:  EKG:

## 2020-04-27 NOTE — CONSULT NOTE ADULT - PROBLEM SELECTOR RECOMMENDATION 3
Per discussions with daughter Flora, pts family does not consider a good quality of life given pts MS. They want to bring pt home to be with loved ones, recommended comfort care approach and home with hospice services.  They will discuss amongst themselves, will f/u. Per discussions with daughter Flora, pts family does not consider a good quality of life given pts MS. They want to bring pt home to be with loved ones, recommended comfort care approach and home with hospice services.  They will discuss amongst themselves, follow up scheduled for tomorrow 10:30 AM with pts family.

## 2020-04-27 NOTE — PROGRESS NOTE ADULT - SUBJECTIVE AND OBJECTIVE BOX
Ortho Progress Note    S: Patient seen and examined. No acute events overnight. Pain well controlled with current regimen. Denies lightheadedness/dizziness, CP/SOB. Tolerating diet.       O:  Physical Exam:  Gen: Laying in bed, NAD, alert and oriented.   Resp: Unlabored breathing  Ext: EHL/FHL/TA/Sol intact          + SILT DP/SP/JOHNSON/Sa/Tib          +DP, extremity WWP    Vital Signs Last 24 Hrs  T(C): 36.7 (27 Apr 2020 05:42), Max: 36.7 (26 Apr 2020 12:10)  T(F): 98.1 (27 Apr 2020 05:42), Max: 98.1 (27 Apr 2020 05:42)  HR: 96 (26 Apr 2020 12:10) (96 - 98)  BP: 141/71 (27 Apr 2020 05:42) (112/58 - 141/71)  BP(mean): 91 (26 Apr 2020 12:10) (80 - 91)  RR: 17 (27 Apr 2020 05:42) (17 - 19)  SpO2: 100% (27 Apr 2020 05:42) (97% - 100%)                          8.8    9.71  )-----------( 231      ( 26 Apr 2020 05:22 )             26.3                         9.2    10.03 )-----------( 234      ( 25 Apr 2020 07:00 )             28.3       04-26    140  |  109<H>  |  14  ----------------------------<  288<H>  3.0<L>   |  16<L>  |  0.71        PT/INR - ( 25 Apr 2020 07:00 )   PT: 12.4 SEC;   INR: 1.08          PTT - ( 25 Apr 2020 07:00 )  PTT:22.8 SEC Ortho Progress Note    S: Patient seen and examined. No acute events overnight. Pain well controlled with current regimen. Denies lightheadedness/dizziness, CP/SOB. Tolerating diet.       O:  Physical Exam:  Gen: Laying in bed, NAD, demented. non verbal  Resp: Unlabored breathing  Ext: no movement RUE   RLE- 	EHL/FHL/TA/Sol intact          + SILT DP/SP/JOHNSON/Sa/Tib          +DP, extremity WWP    Vital Signs Last 24 Hrs  T(C): 36.7 (27 Apr 2020 05:42), Max: 36.7 (26 Apr 2020 12:10)  T(F): 98.1 (27 Apr 2020 05:42), Max: 98.1 (27 Apr 2020 05:42)  HR: 96 (26 Apr 2020 12:10) (96 - 98)  BP: 141/71 (27 Apr 2020 05:42) (112/58 - 141/71)  BP(mean): 91 (26 Apr 2020 12:10) (80 - 91)  RR: 17 (27 Apr 2020 05:42) (17 - 19)  SpO2: 100% (27 Apr 2020 05:42) (97% - 100%)                          8.8    9.71  )-----------( 231      ( 26 Apr 2020 05:22 )             26.3                         9.2    10.03 )-----------( 234      ( 25 Apr 2020 07:00 )             28.3       04-26    140  |  109<H>  |  14  ----------------------------<  288<H>  3.0<L>   |  16<L>  |  0.71        PT/INR - ( 25 Apr 2020 07:00 )   PT: 12.4 SEC;   INR: 1.08          PTT - ( 25 Apr 2020 07:00 )  PTT:22.8 SEC Ortho Progress Note    S: Patient seen and examined. No acute events overnight. Pain well controlled with current regimen.     O:  Physical Exam:  Gen: Laying in bed, NAD, demented. non verbal  Resp: Unlabored breathing  Ext: no movement RUE   RLE- 	EHL/FHL/TA/Sol intact          + SILT DP/SP/JOHNSON/Sa/Tib          +DP, extremity WWP    Vital Signs Last 24 Hrs  T(C): 36.7 (27 Apr 2020 05:42), Max: 36.7 (26 Apr 2020 12:10)  T(F): 98.1 (27 Apr 2020 05:42), Max: 98.1 (27 Apr 2020 05:42)  HR: 96 (26 Apr 2020 12:10) (96 - 98)  BP: 141/71 (27 Apr 2020 05:42) (112/58 - 141/71)  BP(mean): 91 (26 Apr 2020 12:10) (80 - 91)  RR: 17 (27 Apr 2020 05:42) (17 - 19)  SpO2: 100% (27 Apr 2020 05:42) (97% - 100%)                          8.8    9.71  )-----------( 231      ( 26 Apr 2020 05:22 )             26.3                         9.2    10.03 )-----------( 234      ( 25 Apr 2020 07:00 )             28.3       04-26    140  |  109<H>  |  14  ----------------------------<  288<H>  3.0<L>   |  16<L>  |  0.71        PT/INR - ( 25 Apr 2020 07:00 )   PT: 12.4 SEC;   INR: 1.08          PTT - ( 25 Apr 2020 07:00 )  PTT:22.8 SEC Ortho Progress Note    S: Patient seen and examined. No acute events overnight. Pain well controlled with current regimen.     O:  Physical Exam:  Gen: Laying in bed, alert but not responsive to commands or interactive, non-verbal  Resp: Unlabored breathing  Ext: no movement RUE   RLE- 	EHL/FHL/TA/Sol intact          + SILT DP/SP/JOHNSON/Sa/Tib          +DP, extremity WWP    Vital Signs Last 24 Hrs  T(C): 36.7 (27 Apr 2020 05:42), Max: 36.7 (26 Apr 2020 12:10)  T(F): 98.1 (27 Apr 2020 05:42), Max: 98.1 (27 Apr 2020 05:42)  HR: 96 (26 Apr 2020 12:10) (96 - 98)  BP: 141/71 (27 Apr 2020 05:42) (112/58 - 141/71)  BP(mean): 91 (26 Apr 2020 12:10) (80 - 91)  RR: 17 (27 Apr 2020 05:42) (17 - 19)  SpO2: 100% (27 Apr 2020 05:42) (97% - 100%)                          8.8    9.71  )-----------( 231      ( 26 Apr 2020 05:22 )             26.3                         9.2    10.03 )-----------( 234      ( 25 Apr 2020 07:00 )             28.3       04-26    140  |  109<H>  |  14  ----------------------------<  288<H>  3.0<L>   |  16<L>  |  0.71        PT/INR - ( 25 Apr 2020 07:00 )   PT: 12.4 SEC;   INR: 1.08          PTT - ( 25 Apr 2020 07:00 )  PTT:22.8 SEC

## 2020-04-28 NOTE — PROGRESS NOTE ADULT - PROBLEM SELECTOR PLAN 2
s/p ORIF  continue care as per Ortho   Rehab consult appreciated -- given new CVA, unclear rehab potential at present time  Pt ordered for pain control with prn oxycodone- But pt NPO  and not received any PRNS. Appears comfortable. DW Palliative

## 2020-04-28 NOTE — PROGRESS NOTE ADULT - SUBJECTIVE AND OBJECTIVE BOX
Medicine Progress note    Patient is a 89y old  Female who presents with a chief complaint of R hip fx (26 Apr 2020 05:26)    SUBJECTIVE / OVERNIGHT EVENTS: Pt seen/examined by me   Awake,non verbal.  Non  responsive to verbal stimuli or commands. Appears comfortable.     MEDICATIONS  (STANDING):  aspirin Suppository 300 milliGRAM(s) Rectal daily  atorvastatin 80 milliGRAM(s) Oral at bedtime  dextrose 5%. 1000 milliLiter(s) (50 mL/Hr) IV Continuous <Continuous>  dextrose 50% Injectable 12.5 Gram(s) IV Push once  dextrose 50% Injectable 25 Gram(s) IV Push once  dextrose 50% Injectable 25 Gram(s) IV Push once  diltiazem Infusion 7.5 mG/Hr (7.5 mL/Hr) IV Continuous <Continuous>  famotidine    Tablet 20 milliGRAM(s) Oral two times a day  insulin lispro (HumaLOG) corrective regimen sliding scale   SubCutaneous every 6 hours  lactated ringers. 1000 milliLiter(s) (50 mL/Hr) IV Continuous <Continuous>  potassium chloride  10 mEq/100 mL IVPB 10 milliEquivalent(s) IV Intermittent every 1 hour  senna 2 Tablet(s) Oral at bedtime    MEDICATIONS  (PRN):  acetaminophen  IVPB .. 1000 milliGRAM(s) IV Intermittent every 8 hours PRN Severe Pain (7 - 10)  dextrose 40% Gel 15 Gram(s) Oral once PRN Blood Glucose LESS THAN 70 milliGRAM(s)/deciliter  glucagon  Injectable 1 milliGRAM(s) IntraMuscular once PRN Glucose LESS THAN 70 milligrams/deciliter  oxyCODONE    IR 2.5 milliGRAM(s) Oral every 4 hours PRN Moderate Pain (4 - 6)  oxyCODONE    IR 5 milliGRAM(s) Oral every 4 hours PRN Severe Pain (7 - 10)        CAPILLARY BLOOD GLUCOSE  POCT Blood Glucose.: 283 mg/dL (28 Apr 2020 12:02)  POCT Blood Glucose.: 275 mg/dL (28 Apr 2020 06:47)  POCT Blood Glucose.: 261 mg/dL (27 Apr 2020 23:58)  POCT Blood Glucose.: 287 mg/dL (27 Apr 2020 18:04)    I&O's Summary    25 Apr 2020 07:01  -  26 Apr 2020 07:00  --------------------------------------------------------  IN: 0 mL / OUT: 200 mL / NET: -200 mL    PHYSICAL EXAM:    Vital Signs Last 24 Hrs  T(C): 37 (28 Apr 2020 09:55), Max: 37.1 (28 Apr 2020 00:20)  T(F): 98.6 (28 Apr 2020 09:55), Max: 98.8 (28 Apr 2020 00:20)  HR: 102 (28 Apr 2020 09:55) (84 - 102)  BP: 128/67 (28 Apr 2020 09:55) (128/67 - 145/77)  BP(mean): --  RR: 17 (28 Apr 2020 09:55) (17 - 19)  SpO2: 95% (28 Apr 2020 09:55) (95% - 100%)  CONSTITUTIONAL: NAD, eyes open but minimally responsive   RESPIRATORY: Normal respiratory effort  CARDIOVASCULAR: Regular rate and rhythm, No significant lower extremity edema  ABDOMEN: Nontender to palpation, normoactive bowel sounds, no rebound/guarding  Neuro: unable to assess as pt not following commands , Right arm- providing some resistance today , left arm- pt provides resistance   SKIN: No rashes; no palpable lesions,  EXTREMTIES- Dressing present at surgical site, + ecchymoses over R leg    LABS:                                          9.1    9.24  )-----------( 286      ( 28 Apr 2020 06:30 )             27.6     04-28    133<L>  |  102  |  11  ----------------------------<  280<H>  3.2<L>   |  20<L>  |  0.58    Ca    8.2<L>      28 Apr 2020 06:30  Phos  2.0     04-28  Mg     1.4     04-28        COVID-19 PCR: NotDetec (04-23-20 @ 17:28)    RADIOLOGY & ADDITIONAL TESTS:  Imaging : CT head from 4/25Acute left MILIND and MCA territory infarct involving the left frontal and parietal lobes. No dmitriy hemorrhagic transformation.  Chronic right frontoparietal infarct.  No acute infiltrate. Mild cardiomegaly.  Chest Xray       Case discussed with: Ortho resident

## 2020-04-28 NOTE — PROGRESS NOTE ADULT - ASSESSMENT
89F sp fall 1 week ago with progressive deficits before admission for R hip fracture, treated with R IMN, POD#4.    - pain control  - DVT ppx w/ ASA rectal suppository, Lovenox held for possible PEG today  - FU cards recs: currently rate controlled on current regimen  - Patient failed daily dysphagia screen, and S&S. - NPO, IVF, replete electrolytes as necessary  - WBAT/PT/OT  - palliative care initially discussed with grand-daughter who opted for no PEG, comfort care and home   - Palliative had discussion with grand-daughter yesterday who initially opted for no PEG, comfort care, and home hospice, however, when discussed with other children/grandchildren, patient's family decided to proceed with PEG placement.  - IR consulted for PEG placement, call in AM for timing of procedure.  - CTAP for PEG planning showed no interference but did show opacities at lower lungs "concerning for possible COVID pneumonia"  - repeat COVID-19 PCR test sent and received by lab. contact precautions  - FU CXR  - FU AM labs 89F sp fall 1 week ago with progressive deficits before admission for R hip fracture, treated with R IMN, POD#4.    - pain control  - DVT ppx w/ ASA rectal suppository, Lovenox held for possible PEG today  - FU cards recs: currently rate controlled on current regimen  - Patient failed daily dysphagia screen, and S&S. - NPO, IVF, replete electrolytes as necessary  - WBAT/PT/OT  - palliative care initially discussed with grand-daughter who opted for no PEG, comfort care and home   - Palliative had discussion with grand-daughter yesterday who initially opted for no PEG, comfort care, and home hospice, however, when discussed with other children/grandchildren, patient's family decided to proceed with PEG placement. Palliative to continue discussion with family today  - IR consulted for PEG placement, call in AM for timing of procedure.  - CTAP for PEG planning showed no interference but did show opacities at lower lungs "concerning for possible COVID pneumonia"  - repeat COVID-19 PCR test sent and received by lab. contact precautions  - FU CXR  - FU AM labs

## 2020-04-28 NOTE — PROGRESS NOTE ADULT - PROBLEM SELECTOR PLAN 1
Acute L MILIND and MCA infarct  with aphasia/Dysphagia   Suspect embolic due to Afib   Appreciate Neuro rec- hold AC, given large infarct, can consider resumption in 1-2 weeks after repeat CTH if in line with family GOC  On  ASA 300mg, avoid FD anticoagulation, DVT ppx dose only  As per Palliative note from 4/27 - recommended comfort care approach and home with hospice services. FU recommendation from family  meeting this AM   As per Ortho discussion with palliative, family opting for PEG tube  DW Ortho-Plan to place NGT today   Plan to start Glucerna 1.2, start at 20ml/hour, increase by 10ml every 4 hours for goal of 50 ml.   Nutrition consult called

## 2020-04-28 NOTE — PROGRESS NOTE ADULT - ASSESSMENT
MAT  AFIB with RVR  now in sinus rhythm   cont cardizem infusion as pt failed S/S  The calculated [id number] score is 7 , A/C is recommended once deemed safe     Acute CVA  fu with neurology   a/c once deemed safe     HTN  stable     DM type II  Monitor finger stick. Insulin coverage.   Hga1c 8.8  fu with Med     Rule out Covid 19 PNA

## 2020-04-28 NOTE — PROGRESS NOTE ADULT - SUBJECTIVE AND OBJECTIVE BOX
Orthopedic Surgery Progress Note    Patient seen and examined this morning. No acute events overnight. Patient unable to answer questions due to aphasia. She had an episode of rapid afib yesterday to 180s (BP was stable) which resolved with 5mg lopressor IV. Palliative had discussion with grand-daughter yesterday who initially opted for no PEG, comfort care, and home hospice, however, when discussed with other children/grandchildren, patient's family decided to proceed with PEG placement. Potassium replete yesterday and fluids switched to LR. IR consult was placed and they recommended a CT abdomen/pelvis for planning which showed no interference but did show opacities in lower lungs concerning for possible COVID pneumonia. Repeat COVID-19 test sent yesterday which is pending    O:  Vital Signs Last 24 Hrs  T(C): 37.1 (28 Apr 2020 00:20), Max: 37.1 (28 Apr 2020 00:20)  T(F): 98.8 (28 Apr 2020 00:20), Max: 98.8 (28 Apr 2020 00:20)  HR: 94 (28 Apr 2020 00:20) (79 - 94)  BP: 145/76 (28 Apr 2020 00:20) (140/72 - 152/80)  BP(mean): --  RR: 19 (28 Apr 2020 00:20) (17 - 19)  SpO2: 98% (28 Apr 2020 00:20) (98% - 100%)      Gen: Laying in bed, alert but not responsive to commands or interactive, non-verbal  Resp: Unlabored breathing  Ext:   RUE:   - no movement RUE.   - WWP  RLE:   - dressing c/d/i     - EHL/FHL/TA/Sol intact but weak  +DP, extremity WWP      Labs:                        8.8    9.50  )-----------( 263      ( 27 Apr 2020 06:55 )             26.4       04-27    136  |  103  |  9   ----------------------------<  310<H>  3.3<L>   |  19<L>  |  0.61

## 2020-04-28 NOTE — PROGRESS NOTE ADULT - PROBLEM SELECTOR PLAN 3
Episode of RVR on 4/27   on cardizem gtt 7.5mg/hr  Plan to switch to PO once PEG tube in situ   - no plan for FD A/c given concern for hemorrhagic conversion --  rectally, DVT ppx dose of A/C only

## 2020-04-28 NOTE — PROGRESS NOTE ADULT - PROBLEM SELECTOR PLAN 4
A1C- 8.4 , BS in 270s- 280s  NPO as failed speech/swallow   Was on  D5NS at 50/hr which was dced on 4/27  as BS elevated   Anticipate decrease in BS as D5 dced   On  sliding scale q6 while NPO/on IVF as FS high

## 2020-04-28 NOTE — PROGRESS NOTE ADULT - ASSESSMENT
89 year old Faroese-speaking female patient w/ PMH of CAD, HTN, HLD, CVA on Plavix, DM2 -- BIBEMS from home for worsening mental status and found with R hip fracture s/p fall. Now s/p ORIF on 4/24. Also with A fib in RVR on admission, converted to SR with stabilized HR on cardizem gtt. New L MILIND and MCA infarct on CT head 4/25. Failed speech and swallow.

## 2020-04-28 NOTE — PROGRESS NOTE ADULT - SUBJECTIVE AND OBJECTIVE BOX
Subjective: Patient seen and examined. No new events except as noted.     SUBJECTIVE/ROS:        MEDICATIONS:  MEDICATIONS  (STANDING):  aspirin Suppository 300 milliGRAM(s) Rectal daily  atorvastatin 80 milliGRAM(s) Oral at bedtime  dextrose 5%. 1000 milliLiter(s) (50 mL/Hr) IV Continuous <Continuous>  dextrose 50% Injectable 12.5 Gram(s) IV Push once  dextrose 50% Injectable 25 Gram(s) IV Push once  dextrose 50% Injectable 25 Gram(s) IV Push once  diltiazem Infusion 7.5 mG/Hr (7.5 mL/Hr) IV Continuous <Continuous>  famotidine    Tablet 20 milliGRAM(s) Oral two times a day  insulin lispro (HumaLOG) corrective regimen sliding scale   SubCutaneous every 6 hours  lactated ringers. 1000 milliLiter(s) (50 mL/Hr) IV Continuous <Continuous>  potassium chloride  10 mEq/100 mL IVPB 10 milliEquivalent(s) IV Intermittent every 1 hour  senna 2 Tablet(s) Oral at bedtime      PHYSICAL EXAM:  T(C): 37.1 (04-28-20 @ 00:20), Max: 37.1 (04-28-20 @ 00:20)  HR: 94 (04-28-20 @ 00:20) (79 - 94)  BP: 145/76 (04-28-20 @ 00:20) (140/72 - 152/80)  RR: 19 (04-28-20 @ 00:20) (17 - 19)  SpO2: 98% (04-28-20 @ 00:20) (98% - 100%)  Wt(kg): --  I&O's Summary    27 Apr 2020 07:01 - 28 Apr 2020 07:00  --------------------------------------------------------  IN: 57.5 mL / OUT: 1550 mL / NET: -1492.5 mL    28 Apr 2020 07:01  -  28 Apr 2020 09:22  --------------------------------------------------------  IN: 172.5 mL / OUT: 0 mL / NET: 172.5 mL      Height (cm): 162.6 (04-27 @ 17:21)  Weight (kg): 56.6 (04-27 @ 17:21)  BMI (kg/m2): 21.4 (04-27 @ 17:21)  BSA (m2): 1.6 (04-27 @ 17:21)      JVP: Normal  Neck: supple  Lung: clear   CV: S1 S2 , Murmur:  Abd: soft  Ext: No edema  neuro: Awake  Psych: flat affect  Skin: normal``    LABS/DATA:    CARDIAC MARKERS:                                9.1    9.24  )-----------( 286      ( 28 Apr 2020 06:30 )             27.6     04-28    133<L>  |  102  |  11  ----------------------------<  280<H>  3.2<L>   |  20<L>  |  0.58    Ca    8.2<L>      28 Apr 2020 06:30  Phos  2.0     04-28  Mg     1.4     04-28      proBNP:   Lipid Profile:   HgA1c:   TSH:     TELE:  EKG:

## 2020-04-28 NOTE — PROGRESS NOTE ADULT - PROBLEM SELECTOR PLAN 9
Reviewed Palliative note from 4/27-  Per discussions with daughter Flora, pts family does not consider a good quality of life given pts MS. They want to bring pt home to be with loved ones, recommended comfort care approach and home with hospice services.   DW Palliative attending Dr Mari- Plan for follow meeting  tomorrow 10:30 AM with pts family.  FU recs from  meeting   Granddaughter Dinorah Bowens (an RN) - [phone] -- is main point of contact

## 2020-04-29 NOTE — PROVIDER CONTACT NOTE (OTHER) - ASSESSMENT
patient AxO x 0. patient fell at home. Admitted with fracture of right femur. patients BP is 152/ 61 and . Nonverbal indictors of pain. Cardizem given as ordered. Cardiac monitoring continued.

## 2020-04-29 NOTE — DIETITIAN INITIAL EVALUATION ADULT. - PERTINENT LABORATORY DATA
04-29 Na 136 mmol/L Glu 336 mg/dL<H> K+ 3.1 mmol/L<L> Cr 0.52 mg/dL BUN 13 mg/dL Phos 2.6 mg/dL  04-29 @ 06:05 POCT 316 mg/dL  04-28 @ 23:51 POCT 259 mg/dL  04-28 @ 18:00 POCT 241 mg/dL  04-28 @ 12:02 POCT 283 mg/dL

## 2020-04-29 NOTE — DISCHARGE NOTE PROVIDER - HOSPITAL COURSE
This is a 89F w/ hx of CAD, HTN, HLD, CVA on plavix, and DM who was admitted on 4/23. She presented to the ED, brought in by her daughter, after a fall on 4/18 where she landed on her right leg. After the fall she was unable to ambulate and per her daughter over time became less able to communicate ultimately becoming non-verbal. They tried to stay at home because of the COVID pandemic, however they felt they needed to come to the hospital given her continued worsening. She was found to have an intertrochanteric fracture of the right femur. Additionally, she was found to be in new-onset atrial fibrillation. She was started on a cardizem infusion and her heart rate became controlled. She was seen by internal medicine and cardiology and was deemed to be high risk, but with no modifiable risk factors, and therefore cleared for surgery. She underwent IMN of the R femur on 4/24 and the procedure was tolerated well without complications. Following the surgery, the patient's daughter mentioned that she had noticed at home that the patient's daughter was moving her right arm less. On examination, she was noted to have a right sided hemiparesis and upgoing babinski sign on the left side. Neurology was consulted and recommended CT scan, which showed a massive L MCA infarct with high risk of hemorrhagic conversion. Given the nature of the stroke, she was determined to be contraindicated for anticoagulation or further intervention. They noted her prognosis to be poor. She was transitioned to PO cardizem for her atrial fibrillation. She failed a speech and swallow evaluation, so she was kept NPO. Palliative care was consulted to discuss goals of care with the patient's family. The family insisted on keeping her full code and pursuing a PEG for nutritional support so she could go home with home hospice. An NGT was placed on 4/28 and tube feeds were started. On 4/30 she underwent PEG placement with IR...

## 2020-04-29 NOTE — PROVIDER CONTACT NOTE (OTHER) - BACKGROUND
patient AxO x 0. patient fell at home. Admitted with fracture of right femur. patient has a history of cva, diabetes, cad, hyperlipemia, and hypertension.

## 2020-04-29 NOTE — PROVIDER CONTACT NOTE (OTHER) - SITUATION
Patient is on continuous nasogastric tube feed. Regurgitation noticed on left side of face, chest and on bed linen. Audible gurgles heard. Heart rate 120-140.

## 2020-04-29 NOTE — PROGRESS NOTE ADULT - PROBLEM SELECTOR PLAN 1
Acute L MILIND and MCA infarct  with aphasia/Dysphagia   Suspect embolic due to Afib   Appreciate Neuro rec-Advise ASA 81mg , hold AC, given large infarct, can consider resumption in 1-2 weeks after repeat CTH if in line with family GOC  As per Ortho discussion with palliative, family opting for PEG tube and then dc to home with home hospice   s/p NGT today - on  Glucerna 1.5. FU Nutrition consult Acute L MILIND and MCA infarct  with aphasia/Dysphagia   Suspect embolic due to Afib   Appreciate Neuro rec-Advise ASA 81mg , hold AC, given large infarct, can consider resumption in 1-2 weeks after repeat CTH if in line with family GOC  As per Ortho discussion with palliative, family opting for PEG tube and then dc to home with home hospice   s/p NGT today - on  Glucerna 1.5. FU Nutrition consult  noted Mild leukocytosis this AM- suspect reactive, continue to monitor

## 2020-04-29 NOTE — DISCHARGE NOTE PROVIDER - NSDCCPCAREPLAN_GEN_ALL_CORE_FT
PRINCIPAL DISCHARGE DIAGNOSIS  Diagnosis: Intertrochanteric fracture of right hip, closed, initial encounter  Assessment and Plan of Treatment:

## 2020-04-29 NOTE — DISCHARGE NOTE PROVIDER - CARE PROVIDER_API CALL
Chi Scott (MD)  Orthopaedic Surgery  [address]  [place] [address]  Phone: [phone]  Fax: [phone]  Follow Up Time:

## 2020-04-29 NOTE — PROGRESS NOTE ADULT - SUBJECTIVE AND OBJECTIVE BOX
Subjective: Patient seen and examined. No new events except as noted.     SUBJECTIVE/ROS:      MEDICATIONS:  MEDICATIONS  (STANDING):  acetaminophen    Suspension .. 850 milliGRAM(s) Enteral Tube every 8 hours  aspirin 325 milliGRAM(s) Enteral Tube daily  dextrose 5%. 1000 milliLiter(s) (50 mL/Hr) IV Continuous <Continuous>  dextrose 50% Injectable 12.5 Gram(s) IV Push once  dextrose 50% Injectable 25 Gram(s) IV Push once  dextrose 50% Injectable 25 Gram(s) IV Push once  diltiazem    Tablet 60 milliGRAM(s) Enteral Tube every 6 hours  insulin lispro (HumaLOG) corrective regimen sliding scale   SubCutaneous every 6 hours  lactated ringers. 1000 milliLiter(s) (50 mL/Hr) IV Continuous <Continuous>  potassium chloride    Tablet ER 40 milliEquivalent(s) Oral every 4 hours      PHYSICAL EXAM:  T(C): 36.4 (04-29-20 @ 05:39), Max: 37 (04-28-20 @ 09:55)  HR: 97 (04-29-20 @ 05:39) (86 - 110)  BP: 134/68 (04-29-20 @ 05:39) (127/62 - 144/81)  RR: 14 (04-29-20 @ 05:39) (14 - 17)  SpO2: 100% (04-29-20 @ 05:39) (95% - 100%)  Wt(kg): --  I&O's Summary    28 Apr 2020 07:01  -  29 Apr 2020 07:00  --------------------------------------------------------  IN: 1357.5 mL / OUT: 1200 mL / NET: 157.5 mL            JVP: Normal  Neck: supple  Lung: clear   CV: S1 S2 , Murmur:  Abd: soft  Ext: No edema  neuro: Awake   Psych: flat affect  Skin: normal``    LABS/DATA:    CARDIAC MARKERS:                                9.3    11.09 )-----------( 324      ( 29 Apr 2020 06:20 )             28.0     04-29    136  |  101  |  13  ----------------------------<  336<H>  3.1<L>   |  22  |  0.52    Ca    8.1<L>      29 Apr 2020 06:20  Phos  2.6     04-29  Mg     1.8     04-29      proBNP:   Lipid Profile:   HgA1c:   TSH:     TELE:  EKG:

## 2020-04-29 NOTE — PROVIDER CONTACT NOTE (OTHER) - ACTION/TREATMENT ORDERED:
Stopped tube feed. Will continue to monitor. Stopped tube feed. IV push lopressor 5 mg, continue cardizem PO via NGT as scheduled for known AFib. No indication for further workup at this time.

## 2020-04-29 NOTE — PROGRESS NOTE ADULT - PROBLEM SELECTOR PLAN 9
Reviewed Palliative  and Ortho notes  from 4/27-Plan for PEG placement, plan to dc  to home with home hospice   Granddaughter Dinorah Bowens (an RN) - [phone] -- is main point of contact

## 2020-04-29 NOTE — PROGRESS NOTE ADULT - SUBJECTIVE AND OBJECTIVE BOX
Medicine Progress note    Patient is a 89y old  Female who presents with a chief complaint of R hip fx (26 Apr 2020 05:26)    SUBJECTIVE / OVERNIGHT EVENTS: Pt seen/examined by me   Awake,non verbal.  NGT placed yesterday      MEDICATIONS  (STANDING):  acetaminophen    Suspension .. 850 milliGRAM(s) Enteral Tube every 8 hours  aspirin 325 milliGRAM(s) Enteral Tube daily  dextrose 5%. 1000 milliLiter(s) (50 mL/Hr) IV Continuous <Continuous>  dextrose 50% Injectable 12.5 Gram(s) IV Push once  dextrose 50% Injectable 25 Gram(s) IV Push once  dextrose 50% Injectable 25 Gram(s) IV Push once  diltiazem    Tablet 60 milliGRAM(s) Enteral Tube every 6 hours  insulin lispro (HumaLOG) corrective regimen sliding scale   SubCutaneous every 6 hours  insulin regular  human recombinant 6 Unit(s) SubCutaneous every 6 hours  lactated ringers. 1000 milliLiter(s) (75 mL/Hr) IV Continuous <Continuous>  potassium chloride    Tablet ER 40 milliEquivalent(s) Oral every 4 hours    MEDICATIONS  (PRN):  dextrose 40% Gel 15 Gram(s) Oral once PRN Blood Glucose LESS THAN 70 milliGRAM(s)/deciliter  glucagon  Injectable 1 milliGRAM(s) IntraMuscular once PRN Glucose LESS THAN 70 milligrams/deciliter  oxyCODONE    Solution 2.5 milliGRAM(s) Enteral Tube every 6 hours PRN Moderate Pain (4 - 6)  oxyCODONE    Solution 5 milliGRAM(s) Enteral Tube every 6 hours PRN Severe Pain (7 - 10)    CAPILLARY BLOOD GLUCOSE  POCT Blood Glucose.: 355 mg/dL (29 Apr 2020 12:06)  POCT Blood Glucose.: 316 mg/dL (29 Apr 2020 06:05)  POCT Blood Glucose.: 259 mg/dL (28 Apr 2020 23:51)  POCT Blood Glucose.: 241 mg/dL (28 Apr 2020 18:00)    PHYSICAL EXAM:    Vital Signs Last 24 Hrs  T(C): 36.9 (29 Apr 2020 11:55), Max: 36.9 (29 Apr 2020 11:55)  T(F): 98.4 (29 Apr 2020 11:55), Max: 98.4 (29 Apr 2020 11:55)  HR: 110 (29 Apr 2020 11:55) (86 - 110)  BP: 152/61 (29 Apr 2020 11:55) (127/62 - 152/61)  BP(mean): 76 (29 Apr 2020 05:39) (74 - 94)  RR: 15 (29 Apr 2020 11:55) (14 - 16)  SpO2: 98% (29 Apr 2020 11:55) (98% - 100%)    CONSTITUTIONAL: Awake, appears comfortable, NGT present     RESPIRATORY: Normal respiratory effort  CARDIOVASCULAR: Regular rate and rhythm, No significant lower extremity edema  ABDOMEN: Nontender to palpation, normoactive bowel sounds, no rebound/guarding  Neuro: unable to assess as pt not following commands , Right arm- providing some resistance today , left arm- pt provides resistance   SKIN: No rashes; no palpable lesions,  EXTREMTIES- Dressing present at surgical site, + ecchymoses over R leg    LABS:                                                        9.3    11.09 )-----------( 324      ( 29 Apr 2020 06:20 )             28.0   04-29    136  |  101  |  13  ----------------------------<  336<H>  3.1<L>   |  22  |  0.52    Ca    8.1<L>      29 Apr 2020 06:20  Phos  2.6     04-29  Mg     1.8     04-29          COVID-19 PCR: NotDetec (04-23-20 @ 17:28)    RADIOLOGY & ADDITIONAL TESTS:  Imaging : CT head from 4/25Acute left MILIND and MCA territory infarct involving the left frontal and parietal lobes. No dmitriy hemorrhagic transformation.  Chronic right frontoparietal infarct.  No acute infiltrate. Mild cardiomegaly.  Chest Xray       Case discussed with: Ortho resident

## 2020-04-29 NOTE — CHART NOTE - NSCHARTNOTEFT_GEN_A_CORE
Pre-Interventional Radiology Procedure Note    89y    Female    Procedure: PEG    Diagnosis/Indication: Patient is a 89y old  Female who presents with a chief complaint of R hip fx (29 Apr 2020 04:19)      Interventional Radiology Attending Physician:    Ordering Attending Physician: Tyler    PAST MEDICAL & SURGICAL HISTORY:  Hypertension  CVA (cerebral vascular accident)  Hyperlipemia  CAD (coronary artery disease)  Diabetes       CBC Full  -  ( 28 Apr 2020 06:30 )  WBC Count : 9.24 K/uL  RBC Count : 2.93 M/uL  Hemoglobin : 9.1 g/dL  Hematocrit : 27.6 %  Platelet Count - Automated : 286 K/uL  Mean Cell Volume : 94.2 fL  Mean Cell Hemoglobin : 31.1 pg  Mean Cell Hemoglobin Concentration : 33.0 %  Auto Neutrophil # : x  Auto Lymphocyte # : x  Auto Monocyte # : x  Auto Eosinophil # : x  Auto Basophil # : x  Auto Neutrophil % : x  Auto Lymphocyte % : x  Auto Monocyte % : x  Auto Eosinophil % : x  Auto Basophil % : x    04-28    133<L>  |  102  |  11  ----------------------------<  280<H>  3.2<L>   |  20<L>  |  0.58    Ca    8.2<L>      28 Apr 2020 06:30  Phos  2.0     04-28  Mg     1.4     04-28      PT/INR - ( 28 Apr 2020 06:30 )   PT: 13.2 SEC;   INR: 1.15          PTT - ( 28 Apr 2020 06:30 )  PTT:28.0 SEC

## 2020-04-29 NOTE — PROVIDER CONTACT NOTE (OTHER) - SITUATION
patient AxO x 0. patient fell at home. Admitted with fracture of right femur. patient admitted for fracture of the right femur.

## 2020-04-29 NOTE — DISCHARGE NOTE PROVIDER - NSDCFUADDINST_GEN_ALL_CORE_FT
Follow up with Dr. Scott in the office in 1-2 weeks for staple removal  Keep incisions clean and dry  WBAT RLE  Continue ASA per NGT

## 2020-04-29 NOTE — PROGRESS NOTE ADULT - ASSESSMENT
MAT  AFIB with RVR  now in sinus rhythm   cont cardizem   The calculated [id number] score is 7 , A/C is recommended once deemed safe     Acute CVA  fu with neurology   a/c once deemed safe     HTN  stable     DM type II  Monitor finger stick. Insulin coverage.   Hga1c 8.8  fu with Med

## 2020-04-29 NOTE — DIETITIAN INITIAL EVALUATION ADULT. - ENERGY NEEDS
IBW: 56.8 kg  Estimated Calorie Need (25 - 30 kcals/kg ABW): 1415 - 1698 kcals/day  Estimated Protein Need (1.0 - 1.5 gms/kg IBW): 56.8 - 85 gms/day  No skin injuries noted

## 2020-04-29 NOTE — DIETITIAN INITIAL EVALUATION ADULT. - OTHER INFO
89 y/o female admitted with dx of intertochanteric fracture of R hip with CVA. Attempted to contact pt's family via telephone, however calls went to voicemail. Pt had a swallowing evaluation by SLP 4/26 with recommendation for short term non oral means of nutrition. Pt was ordered and is tolerating enteral feeding of Glucerna 1.5 @ 50 ml/hr x 24 hrs for a total volume of 1200 ml/day and providing 1800 kcals, 99 gms protein and 910 ml of free H2O/day. Current feeding is in excess of pt's estimated nutrition needs. Suggest Glucerna 1.5 @ 43 ml/hr x 24 hrs for a total volume of 1032 ml/day, which will offer pt 1547 kcals and 85 gms protein. This would offer pt 27 kcals/kg and 1.5 gms protien/kg of dosing weight. Pt currently with 1+ L/R arm edema. RDN services to remain available as needed.

## 2020-04-29 NOTE — PROGRESS NOTE ADULT - SUBJECTIVE AND OBJECTIVE BOX
Ortho Progress Note    S: Patient seen and examined. No acute events overnight.  Tolerating NGT feeds. Had family meeting yesterday with palliative. Family requesting PEG tube, then home with Home hospice. Per IR, PEG for Thursday. Switched to PO cardizem. COVID neg x2      O:  Physical Exam:  Gen: Laying in bed, NAD, alert, but non-interactive, not obeying commands, nonverbal  HEENT: NGT in place  Resp: Unlabored breathing  RUE: no movement, no reaction to pain  Ext: EHL/FHL/TA/Sol intact          + SILT moves in response to stim          +DP, extremity WWP    Vital Signs Last 24 Hrs  T(C): 36.3 (28 Apr 2020 20:27), Max: 37 (28 Apr 2020 09:55)  T(F): 97.4 (28 Apr 2020 20:27), Max: 98.6 (28 Apr 2020 09:55)  HR: 86 (29 Apr 2020 00:45) (86 - 110)  BP: 135/52 (28 Apr 2020 23:27) (127/62 - 144/81)  BP(mean): 94 (28 Apr 2020 23:27) (74 - 94)  RR: 14 (28 Apr 2020 23:27) (14 - 17)  SpO2: 100% (28 Apr 2020 23:27) (95% - 100%)                          9.1    9.24  )-----------( 286      ( 28 Apr 2020 06:30 )             27.6                         8.8    9.50  )-----------( 263      ( 27 Apr 2020 06:55 )             26.4       04-28    133<L>  |  102  |  11  ----------------------------<  280<H>  3.2<L>   |  20<L>  |  0.58        PT/INR - ( 28 Apr 2020 06:30 )   PT: 13.2 SEC;   INR: 1.15          PTT - ( 28 Apr 2020 06:30 )  PTT:28.0 SEC Ortho Progress Note    S: Patient seen and examined. No acute events overnight.  Tolerating NGT feeds. Had family meeting yesterday with palliative. Family requesting PEG tube, then home with Home hospice. Per IR, PEG for Thursday. Switched to PO cardizem. COVID neg x2      O:  Physical Exam:  Gen: Laying in bed, NAD, alert, but non-interactive, not obeying commands, nonverbal  HEENT: NGT in place  Resp: Unlabored breathing  RUE: no movement, no reaction to pain  Ext: EHL/FHL/TA/Sol intact          + SILT moves in response to stim          +DP, extremity WWP  Incision: c/d/i, no erythema or edema      Vital Signs Last 24 Hrs  T(C): 36.3 (28 Apr 2020 20:27), Max: 37 (28 Apr 2020 09:55)  T(F): 97.4 (28 Apr 2020 20:27), Max: 98.6 (28 Apr 2020 09:55)  HR: 86 (29 Apr 2020 00:45) (86 - 110)  BP: 135/52 (28 Apr 2020 23:27) (127/62 - 144/81)  BP(mean): 94 (28 Apr 2020 23:27) (74 - 94)  RR: 14 (28 Apr 2020 23:27) (14 - 17)  SpO2: 100% (28 Apr 2020 23:27) (95% - 100%)                          9.1    9.24  )-----------( 286      ( 28 Apr 2020 06:30 )             27.6                         8.8    9.50  )-----------( 263      ( 27 Apr 2020 06:55 )             26.4       04-28    133<L>  |  102  |  11  ----------------------------<  280<H>  3.2<L>   |  20<L>  |  0.58        PT/INR - ( 28 Apr 2020 06:30 )   PT: 13.2 SEC;   INR: 1.15          PTT - ( 28 Apr 2020 06:30 )  PTT:28.0 SEC

## 2020-04-29 NOTE — DISCHARGE NOTE PROVIDER - NSDCMRMEDTOKEN_GEN_ALL_CORE_FT
amLODIPine 10 mg oral tablet: 1 tab(s) orally once a day  Aspir 81 oral delayed release tablet: 1 tab(s) orally once a day  clopidogrel 75 mg oral tablet: 1 tab(s) orally once a day  famotidine 20 mg oral tablet: 1 tab(s) orally 2 times a day  fosinopril 40 mg oral tablet: 1 tab(s) orally once a day  glimepiride 4 mg oral tablet: 1 tab(s) orally 2 times a day  metFORMIN 500 mg oral tablet: 1 tab(s) orally 2 times a day

## 2020-04-29 NOTE — PROGRESS NOTE ADULT - ASSESSMENT
89 year old Croatian-speaking female patient w/ PMH of CAD, HTN, HLD, CVA on Plavix, DM2 -- BIBEMS from home for worsening mental status and found with R hip fracture s/p fall. Now s/p ORIF on 4/24. Also with A fib in RVR on admission, converted to SR with stabilized HR on cardizem gtt. New L MILIND and MCA infarct on CT head 4/25. Failed speech and swallow.

## 2020-04-29 NOTE — PROGRESS NOTE ADULT - PROBLEM SELECTOR PLAN 3
Episode of RVR on 4/27   HR fluctuating between   on cardizem  60mg Q 6 hours through NGT  [id number] score is 7  Appreciate Neuro - hold AC given large infarct, will need repeat CTH in 1-2 weeks if reconsidering AC   On ASA  81mg   Cardiology on board

## 2020-04-29 NOTE — PROGRESS NOTE ADULT - PROBLEM SELECTOR PLAN 4
A1C- 8.4 , BS in 250s-300s  On Glucerna TF  Added Regular Insulin 6 units Q 6hour plus sss  Uptitrate Insulin as per BS needs  On  sliding scale

## 2020-04-29 NOTE — PROGRESS NOTE ADULT - PROBLEM SELECTOR PLAN 8
- SQ Lovenox at DVT ppx dose   - SCDs -Restart  SQ Lovenox at DVT ppx dose  post IR procedure   - SCDs

## 2020-04-29 NOTE — PROGRESS NOTE ADULT - ASSESSMENT
89F sp fall 1 week ago with progressive deficits before admission for hip fracture    ·	Neuro: Pain control  ·	appreciate neuro consult   ·	Resp: IS  ·	Cards: appreciate cards recs  ·	FU Cards re medication now that patient NPO, currently rate controlled  ·	had preop TTE  ·	GI: Tube feeds via NGT, Meds via NGT, NPOpMN for IR   ·	MSK: WBAT, PT/OT  ·	Heme: DVT PPX, no full AC per neuro  ·	Palliative: PEG then home hospice   ·	COVID neg x2    Ortho [id number]

## 2020-04-30 NOTE — PROGRESS NOTE ADULT - PROBLEM SELECTOR PROBLEM 1
Acute respiratory failure with hypoxia
Cerebrovascular accident (CVA), unspecified mechanism
Intertrochanteric fracture of right hip, closed, initial encounter
Intertrochanteric fracture of right hip, closed, initial encounter
Respiratory distress
Intertrochanteric fracture of right hip, closed, initial encounter

## 2020-04-30 NOTE — PROGRESS NOTE ADULT - PROBLEM SELECTOR PLAN 2
Unable to place PEG given pt unstable  Discussed with family, they understand pt is dying and just want to take her home.

## 2020-04-30 NOTE — PROGRESS NOTE ADULT - PROBLEM SELECTOR PLAN 5
A1C- 8.4 , BS in 250s-300s  Was on Glucerna TF  Regular Insulin 6 units Q 6hour added  on 4/29   On  sss  Uptitrate Insulin as per BS needs  On  sliding scale

## 2020-04-30 NOTE — GOALS OF CARE CONVERSATION - ADVANCED CARE PLANNING - CONVERSATION DETAILS
At this time they understand pts poor prognosis. They understand that given pt being unstable and with increased secretions from tube feeds PEG cannot be placed.  Their main goal is to bring pt home with hospice services.  They do not want pt to be intubated or resuscitated. Pt is now DNR/DNI, MOLST form will be placed in the chart,
Discussed pts guarded prognosis and critical state. Family expressed that they want to take pt home with hospice services but would want pt to get a PEG before that.   Concluded that plan would be placing the PEG, advance diet as needed, make hospice referral to start process and be ready for d/c once PEG is placed.

## 2020-04-30 NOTE — PROGRESS NOTE ADULT - PROBLEM SELECTOR PLAN 4
with episodes of RVR   Was on  cardizem  60mg Q 6 hours through NGT   EYF1SG2-BPWw score is 7  Appreciate Neuro - hold AC given large infarct, will need repeat CTH in 1-2 weeks if reconsidering AC   On ASA  81mg   Cardiology on board

## 2020-04-30 NOTE — PROGRESS NOTE ADULT - PROBLEM SELECTOR PLAN 1
with leukocytosis  concern for Aspiration PNA  DW Ortho resident- Advise Vanc/Zosyn/Morphine PRN  I spoke to pts grand-daughter Jayne- Updated worsening clinical status with respiratory distress and concern for aspiration PNA   Palliative to speak with pts daughter who is HCP

## 2020-04-30 NOTE — PROVIDER CONTACT NOTE (OTHER) - ASSESSMENT
patient /64, , O2 91, RR 36/min. Patient is heavily breathing and grunting. patient using accessory muscles.

## 2020-04-30 NOTE — PROGRESS NOTE ADULT - PROBLEM SELECTOR PLAN 4
Pt is now DNR/DNI, comfort measures, goal is for pt to go home with hospice and be with family. Discussed with hospice.   Pt actively dying.

## 2020-04-30 NOTE — PROGRESS NOTE ADULT - PROBLEM SELECTOR PLAN 1
Increased work of breathing, increased secretions, likely from aspiration PNA.  Would recommend scope patch, to attempt pt to go home.  PRN Morphine, now IV, plan would be to convert to concentrated solution.

## 2020-04-30 NOTE — PROGRESS NOTE ADULT - REASON FOR ADMISSION
R hip fx

## 2020-04-30 NOTE — PROGRESS NOTE ADULT - PROBLEM SELECTOR PROBLEM 8
Hypokalemia
Preventive measure

## 2020-04-30 NOTE — PROGRESS NOTE ADULT - SUBJECTIVE AND OBJECTIVE BOX
SUBJECTIVE AND OBJECTIVE:  Worsening respiratory status, increased congestion, likely asp PNA    INTERVAL HPI/OVERNIGHT EVENTS:  PEG cancelled, family wants to take pt home    DNR on chart: Yes    Allergies    No Known Allergies    Intolerances    MEDICATIONS  (STANDING):  acetaminophen  IVPB .. 1000 milliGRAM(s) IV Intermittent once  aspirin Suppository 300 milliGRAM(s) Rectal daily  dextrose 5%. 1000 milliLiter(s) (50 mL/Hr) IV Continuous <Continuous>  dextrose 50% Injectable 12.5 Gram(s) IV Push once  dextrose 50% Injectable 25 Gram(s) IV Push once  dextrose 50% Injectable 25 Gram(s) IV Push once  diltiazem    Tablet 60 milliGRAM(s) Enteral Tube every 6 hours  diltiazem Infusion 7.5 mG/Hr (7.5 mL/Hr) IV Continuous <Continuous>  insulin lispro (HumaLOG) corrective regimen sliding scale   SubCutaneous every 6 hours  lactated ringers. 1000 milliLiter(s) (75 mL/Hr) IV Continuous <Continuous>  metoprolol tartrate Injectable 5 milliGRAM(s) IV Push every 6 hours  morphine  - Injectable 2 milliGRAM(s) IV Push every 4 hours  piperacillin/tazobactam IVPB.. 3.375 Gram(s) IV Intermittent every 8 hours  scopolamine   Patch 1 Patch Transdermal every 72 hours  vancomycin  IVPB 500 milliGRAM(s) IV Intermittent every 12 hours    MEDICATIONS  (PRN):  dextrose 40% Gel 15 Gram(s) Oral once PRN Blood Glucose LESS THAN 70 milliGRAM(s)/deciliter  glucagon  Injectable 1 milliGRAM(s) IntraMuscular once PRN Glucose LESS THAN 70 milligrams/deciliter    ITEMS UNCHECKED ARE NOT PRESENT    PRESENT SYMPTOMS: [x ]Unable to obtain due to poor mentation   Source if other than patient:  [ ]Family   [ ]Team     Pain (Impact on QOL):    Location:  Minimal acceptable level (0-10 scale):                   Aggravating factors:  Quality:  Radiation:  Severity (0-10 scale):    Timing:    Dyspnea:                           [ ]Mild [ ]Moderate [ ]Severe  Anxiety:                             [ ]Mild [ ]Moderate [ ]Severe  Fatigue:                             [ ]Mild [ ]Moderate [ ]Severe  Nausea:                             [ ]Mild [ ]Moderate [ ]Severe  Loss of appetite:              [ ]Mild [ ]Moderate [ ]Severe  Constipation:                    [ ]Mild [ ]Moderate [ ]Severe    PAIN AD Score:	4  http://geriatrictoolkit.[email or web address]/cog/painad.pdf (Ctrl + left click to view)    Other Symptoms:  [ ]All other review of systems negative     Karnofsky Performance Score/Palliative Performance Status Version 2: 20  %    http://palliative.info/resource_material/PPSv2.pdf    PHYSICAL EXAM:  Vital Signs Last 24 Hrs  T(C): 36.6 (30 Apr 2020 07:46), Max: 37.2 (29 Apr 2020 15:41)  T(F): 97.9 (30 Apr 2020 07:46), Max: 98.9 (29 Apr 2020 15:41)  HR: 112 (30 Apr 2020 08:18) (77 - 130)  BP: 114/65 (30 Apr 2020 08:18) (114/65 - 165/70)  BP(mean): --  RR: 34 (30 Apr 2020 08:18) (15 - 36)  SpO2: 92% (30 Apr 2020 08:18) (91% - 98%) I&O's Summary    29 Apr 2020 07:01  -  30 Apr 2020 07:00  --------------------------------------------------------  IN: 1375 mL / OUT: 1900 mL / NET: -525 mL     GENERAL:  [ ]Alert  [ ]Oriented x   [x ]Lethargic  [ ]Cachexia  [ ]Unarousable  [ ]Verbal  [x ]Non-Verbal  Behavioral:   [ ] Anxiety  [ ] Delirium [ ] Agitation [ ] Other  HEENT:  [ ]Normal   [x ]Dry mouth   [ ]ET Tube/Trach  [ ]Oral lesions  PULMONARY:   [ ]Clear [ ]Tachypnea  [x ]Audible excessive secretions   [ ]Rhonchi        [ ]Right [ ]Left [ ]Bilateral  [ ]Crackles        [ ]Right [ ]Left [ ]Bilateral  [ ]Wheezing     [ ]Right [ ]Left [ ]Bilateral  CARDIOVASCULAR:    [ ]Regular [x ]Irregular [x ]Tachy  [ ]Varghese [ ]Murmur [ ]Other  GASTROINTESTINAL:  [ x]Soft  [ ]Distended   [ ]x+BS  [ ]Non tender [ ]Tender  [ ]PEG [ ]OGT/ NGT   Last BM:    GENITOURINARY:  [ ]Normal [x ] Incontinent   [ ]Oliguria/Anuria   [ ]Euceda  MUSCULOSKELETAL:   [ ]Normal   [ ]Weakness  [x ]Bed/Wheelchair bound [ ]Edema  NEUROLOGIC:   [ ]No focal deficits  x[ ] Cognitive impairment  x[ ] Dysphagia [ ]Dysarthria [ ] Paresis [ ]Other   SKIN:   [x ]Normal   [ ]Pressure ulcer(s)  [ ]Rash    CRITICAL CARE:  [ ] Shock Present  [ ]Septic [ ]Cardiogenic [ ]Neurologic [ ]Hypovolemic  [ ]  Vasopressors [ ]  Inotropes   [x ] Respiratory failure present  [ x] Acute  [ ] Chronic [ ] Hypoxic  [ ] Hypercarbic [ ] Other  [ ] Other organ failure     GRIEF   [ x] Yes  [ ] No    LABS:                        10.5   23.44 )-----------( 392      ( 30 Apr 2020 06:36 )             32.9   04-30    137  |  103  |  18  ----------------------------<  417<H>  4.9   |  14<L>  |  0.60    Ca    8.6      30 Apr 2020 06:36  Phos  3.1     04-30  Mg     1.8     04-30    PT/INR - ( 30 Apr 2020 03:39 )   PT: 13.3 SEC;   INR: 1.16       PTT - ( 30 Apr 2020 03:39 )  PTT:20.7 SEC    RADIOLOGY & ADDITIONAL STUDIES:     < from: Xray Chest 1 View- PORTABLE-Urgent (04.30.20 @ 02:00) >    IMPRESSION:    1.  Lungs are clear.  2.  Enteric tube in the stomach.    Protein Calorie Malnutrition Present: [ ] yes [ ] no  [ x] PPSV2 < or = 30%  [ ] significant weight loss [x ] poor nutritional intake [ ] anasarca [ ] catabolic state Albumin, Serum: 3.7 g/dL (04-23-20 @ 16:28)  Artificial Nutrition [ ]     REFERRALS:   [ ]Chaplaincy  [ ] Hospice  [ ]Child Life  [ ]Social Work  [ ]Case management [ ]Holistic Therapy   Goals of Care Document: SUMAN Faustin (04-30-20 @ 11:22)  Goals of Care Conversation:   Participants:  · Relative  Flora (daughter)    Advance Directives:  · Does patient have Advance Directive  No  · Do you want to complete the HCP and name a [place] Care Agent  No    Conversation Discussion:  · Conversation  MOLST Discussed  · Conversation Details  At this time they understand pts poor prognosis. They understand that given pt being unstable and with increased secretions from tube feeds PEG cannot be placed.  Their main goal is to bring pt home with hospice services.  They do not want pt to be intubated or resuscitated. Pt is now DNR/DNI, MOLST form will be placed in the chart,    Personal Advance Directives Treatment Guidelines:   Treatment Guidelines:  · Treatment Guidelines  DNR Order; Comfort measures only    MOLST:  · Completed  30-Apr-2020    Duration of Advanced Care Planning Meeting:  · Time spent (in minutes)  30    Electronic Signatures:  Juliane Petit)  (Signed 30-Apr-2020 11:24)  	Authored: Goals of Care Conversation, Personal Advance Directives Treatment Guidelines      Last Updated: 30-Apr-2020 11:24 by Juliane Petit)

## 2020-04-30 NOTE — CHART NOTE - NSCHARTNOTEFT_GEN_A_CORE
Patient re-evaluated at bedside:  Patient is tachypneic w/ RR in high 30s. She is saturating ~92-95% on 2L NC. She also is tachycardic in the 130s. BP is stable  There is concern that she may have aspirated NGT feeds. NGT was removed last night.  I spoke with [facility], Dr. Redding, who recommended started vancomycin/zosyn, restarting cardizem gtt, and give IV morphine for high RR. If desaturates as a result, will need to increase oxygen requirements. [facility] will evaluate patient today and we will reach out to palliative and re-discuss goals of care including DNR/DNI status with grand-daughter, HCP

## 2020-04-30 NOTE — PROGRESS NOTE ADULT - SUBJECTIVE AND OBJECTIVE BOX
Medicine Progress note    Patient is a 89y old  Female who presents with a chief complaint of R hip fx (26 Apr 2020 05:26)    SUBJECTIVE / OVERNIGHT EVENTS: Pt seen/examined by me  at 11.00 AM   Pt in respiratory distress, gasping   Non verbal     MEDICATIONS  (STANDING):  acetaminophen  IVPB .. 1000 milliGRAM(s) IV Intermittent once  aspirin Suppository 300 milliGRAM(s) Rectal daily  dextrose 5%. 1000 milliLiter(s) (50 mL/Hr) IV Continuous <Continuous>  dextrose 50% Injectable 12.5 Gram(s) IV Push once  dextrose 50% Injectable 25 Gram(s) IV Push once  dextrose 50% Injectable 25 Gram(s) IV Push once  diltiazem    Tablet 60 milliGRAM(s) Enteral Tube every 6 hours  insulin lispro (HumaLOG) corrective regimen sliding scale   SubCutaneous every 6 hours  lactated ringers. 1000 milliLiter(s) (75 mL/Hr) IV Continuous <Continuous>  metoprolol tartrate Injectable 5 milliGRAM(s) IV Push every 6 hours  morphine  - Injectable 2 milliGRAM(s) IV Push every 4 hours  scopolamine   Patch 1 Patch Transdermal every 72 hours    MEDICATIONS  (PRN):  dextrose 40% Gel 15 Gram(s) Oral once PRN Blood Glucose LESS THAN 70 milliGRAM(s)/deciliter  glucagon  Injectable 1 milliGRAM(s) IntraMuscular once PRN Glucose LESS THAN 70 milligrams/deciliter  CAPILLARY BLOOD GLUCOSE  POCT Blood Glucose.: 355 mg/dL (29 Apr 2020 12:06)  POCT Blood Glucose.: 316 mg/dL (29 Apr 2020 06:05)  POCT Blood Glucose.: 259 mg/dL (28 Apr 2020 23:51)  POCT Blood Glucose.: 241 mg/dL (28 Apr 2020 18:00)    PHYSICAL EXAM:    Vital Signs Last 24 Hrs  T(C): 36.2 (30 Apr 2020 11:40), Max: 37.2 (29 Apr 2020 15:41)  T(F): 97.2 (30 Apr 2020 11:40), Max: 98.9 (29 Apr 2020 15:41)  HR: 80 (30 Apr 2020 11:40) (77 - 130)  BP: 134/61 (30 Apr 2020 11:40) (114/65 - 165/70)  BP(mean): --  RR: 20 (30 Apr 2020 11:40) (15 - 36)  SpO2: 90% (30 Apr 2020 11:40) (90% - 98%)    CONSTITUTIONAL: In respiratory distress  RESPIRATORY: Increased WOB, gasping, gurgling sounds   CARDIOVASCULAR: Regular rate and rhythm, No significant lower extremity edema  ABDOMEN: Nontender to palpation, normoactive bowel sounds, no rebound/guarding  Neuro: unable to assess as pt not following commands , Right arm- providing some resistance today , left arm- pt provides resistance   SKIN: No rashes; no palpable lesions,  EXTREMTIES- Dressing present at surgical site, + ecchymoses over R leg    LABS:                                                       10.5   23.44 )-----------( 392      ( 30 Apr 2020 06:36 )             32.9   04-30    137  |  103  |  18  ----------------------------<  417<H>  4.9   |  14<L>  |  0.60    Ca    8.6      30 Apr 2020 06:36  Phos  3.1     04-30  Mg     1.8     04-30        COVID-19 PCR: NotDetec (04-23-20 @ 17:28)    RADIOLOGY & ADDITIONAL TESTS:  Imaging : CT head from 4/25Acute left MILIND and MCA territory infarct involving the left frontal and parietal lobes. No dmitriy hemorrhagic transformation.  Chronic right frontoparietal infarct.  No acute infiltrate. Mild cardiomegaly.  Chest Xray       Case discussed with: Ortho resident

## 2020-04-30 NOTE — PROGRESS NOTE ADULT - SUBJECTIVE AND OBJECTIVE BOX
Orthopedic Surgery Progress Note  Patient seen and evaluated multiple times overnight. Patient with bursts of rapid afib, controlled with IV lopressor pushes. Also, nurse reported intermittent coughing and gurgling sounds, improved at times. Tube feeds stopped prior to patient being strict NPO at midnight for IR PEG today. CXR ordered.    O:  Vital Signs Last 24 Hrs  T(C): 37 (29 Apr 2020 23:48), Max: 37.2 (29 Apr 2020 15:41)  T(F): 98.6 (29 Apr 2020 23:48), Max: 98.9 (29 Apr 2020 15:41)  HR: 125 (29 Apr 2020 23:48) (77 - 130)  BP: 165/70 (29 Apr 2020 23:48) (133/74 - 165/70)  BP(mean): 76 (29 Apr 2020 05:39) (76 - 76)  RR: 20 (29 Apr 2020 23:48) (14 - 20)  SpO2: 95% (29 Apr 2020 23:48) (95% - 100%)    Gen: NAD  RLE  incisions C/D/I  can wiggle toes  patient non-compliant with exam  WWP distally with good capillary refill    Labs:                        9.3    11.09 )-----------( 324      ( 29 Apr 2020 06:20 )             28.0                         9.1    9.24  )-----------( 286      ( 28 Apr 2020 06:30 )             27.6     04-29    135  |  101  |  12  ----------------------------<  294<H>  4.1   |  18<L>  |  0.49<L>    PT/INR - ( 28 Apr 2020 06:30 )   PT: 13.2 SEC;   INR: 1.15       PTT - ( 28 Apr 2020 06:30 )  PTT:28.0 SEC    A/P 89y year old female POD6 s/p R IMN, post-operative recovery complicated by large L sided CVA likely embolic due to new onset Afib prior to surgery    Pain Control  NPO/IVF for IR today  IR today for PEG  FU AM labs  FU CXR  Holding AC per Neuro recs due to possibility of hemorragic transformation of CVA  FU cards today re: optimization of rate control; PO cardizem through NGT and IV lopressor PRN for now  Tele for AFib  Dispo: Following PEG placement, patient to be DC'd home with home hospice    Gianni Soto MD

## 2020-04-30 NOTE — PROGRESS NOTE ADULT - ASSESSMENT
89 year old German-speaking female patient w/ PMH of CAD, HTN, HLD, CVA on Plavix, DM2 -- BIBEMS from home for worsening mental status and found with R hip fracture s/p fall. Now s/p ORIF on 4/24. Also with A fib in RVR on admission, converted to SR with stabilized HR on cardizem gtt. New L MILIND and MCA infarct on CT head 4/25. Failed speech and swallow.

## 2020-04-30 NOTE — PROGRESS NOTE ADULT - ASSESSMENT
MAT  AFIB with RVR  cont cardizem   change BB to propranolol 10 tid once peg is placed   The calculated [id number] score is 7 , A/C is recommended once deemed safe     Acute CVA  fu with neurology   a/c once deemed safe     HTN  stable     DM type II  Monitor finger stick. Insulin coverage.   Hga1c 8.8  fu with Med

## 2020-04-30 NOTE — PROGRESS NOTE ADULT - ATTENDING COMMENTS
Discussed case with ACP
Pt family electing for PEG placement and DC hari.  Possible aspiration episode yesterday.  CXR is OK.  NPO.  F/u medicine, cardio, palliative.
Pt seen and examined.  exam and plan as above
Pt seen and examined.  Exam and plan as above
Pt seen and examined.  Exam and plan as above.  Family made aware of grim prognosis
Pt has been NPO since thursday due to S/S faiure.  WIll replace K for now.  Awaiting recs from palliative care, after GOC discussion w family,  regarding nutrition for patient.  OOB w PT.
Pt seen and examined.  Exam and plan as above
Pt seen and examined.  Exam and plan as above
Her clinical exam is significant for global aphasia, right hemiplegia left sided head and eye deviation.  I agree with Dr. Do, chief resident's assessment and evaluation.  Based on advanced age, multiple vascular territory ischemic infarct chances of meaningful recovery are very low.  No objection to continuing aspirin and DVT prophylaxis, at this point proceeding with comfort measures appears appropriate.

## 2020-04-30 NOTE — PROVIDER CONTACT NOTE (OTHER) - ASSESSMENT
RN notified by tele tech HR went up to 150s. Patient noted to have wet lung sounds. Patient is heavily breathing and grunting. patient using accessory muscles.

## 2020-04-30 NOTE — DISCHARGE NOTE FOR THE EXPIRED PATIENT - HOSPITAL COURSE
On 4/23, patient presented as a 89y Female PMHx dementia, HTN, CVA (on aspirin, Plavix), HLD, CAD, DM presents s/p mech fall on Saturday c/o severe R hip pain and inability to ambulate. Per patient's daughter, patient woke up during the night and fell on her backside and hurt her hip on Saturday (4/18/2020). Patient was verbalizing that she was in pain but because of COVID pandemic deferred taking her to the hospital. Pain continued to worsen and after 2 1/2 days patient began to have ecchymoses. Ambulance called on 4/23 morning. On Wednesday morning around 11:30am patient was eating, talking, and responsive but a few hours later patient was no longer speaking. Tried to drink milk but was grabbing the cup with the left hand and had difficulty swallowing. Patient continued to progress in her symptoms and was no longer following commands or responding to questioning.  She was diagnosed with a right hip fracture and admitted to orthopedics for intramedullary fixation.  On 4/24, after medical and cardiology clearance, patient underwent IMN. Postoperatively, neurology was consulted and the patient was diagnosed with a massive MCA infarct and it was determined that patient has a poor prognosis.  4/25-4/30: Patient was not able to tolerate PO intake due to her CVA and developed a new-onset rapid afib with a stable blood pressure that converted back and forth from sinus rhythm to rapid afib with cardizem gtt, and IV lopressor pushes. She was started on NG TFs. Palliative care was consulted who discussed with family [facility]. Family opted for PEG placement and comfort care with home hospice. Patient was scheduled to have PEG placed on 4/30. The evening before she was having secretions and was no longer able to tolerate NGT feeds so this was discontinued. On 4/30, her respiratory rate increased to the high 30s. There was concern for possible aspiration. Palliative care continued discussion with family who opted for DNR/DNI and hospice care. She became bradycardic, and cardiopulmonary arrest occured at ~12:40. I confirmed that the patient had passed away and informed the patient's family.

## 2020-04-30 NOTE — PROGRESS NOTE ADULT - PROBLEM SELECTOR PLAN 2
Acute L MILIND and MCA infarct  with aphasia/Dysphagia   Suspect embolic due to Afib   Appreciate Neuro rec-Advise ASA 81mg , hold AC, given large infarct, can consider resumption in 1-2 weeks after repeat CTH if in line with family GOC

## 2020-04-30 NOTE — PROGRESS NOTE ADULT - PROBLEM SELECTOR PLAN 10
Reviewed Palliative  and Ortho notes  from 4/27- Initial Plan for PEG placement, plan to dc  to home with home hospice   Gven acute deterioation in clinical status with respiratory distress, PEG on hold    Grand-daughter Dinorah updated  Discussed with Palliative and Ortho   Palliative on board and spoke with family- Pt made DNR   Granddaughter Dinorah Bowens (an RN) - [phone] -- is main point of contact

## 2020-04-30 NOTE — PROGRESS NOTE ADULT - PROBLEM SELECTOR PLAN 3
CTH showing large LMCA/MILIND infarction in the setting of afib.  Guarded prongosis, discussed with daughter, now main goal is comfort and taking pt home.   Now AOx0, failed S&S, AMS.

## 2020-04-30 NOTE — PROGRESS NOTE ADULT - PROBLEM SELECTOR PROBLEM 2
Atrial fibrillation with rapid ventricular response
Atrial fibrillation with rapid ventricular response
Cerebrovascular accident (CVA), unspecified mechanism
Dysphagia, unspecified type
Intertrochanteric fracture of right hip, closed, initial encounter
Atrial fibrillation with rapid ventricular response

## 2021-05-13 NOTE — PROGRESS NOTE ADULT - PROBLEM SELECTOR PROBLEM 9
Goals of care, counseling/discussion
Preventive measure
English
Goals of care, counseling/discussion

## 2022-02-18 NOTE — DIETITIAN INITIAL EVALUATION ADULT. - PERTINENT MEDS FT
MEDICATIONS  (STANDING):  acetaminophen    Suspension .. 850 milliGRAM(s) Enteral Tube every 8 hours  aspirin 325 milliGRAM(s) Enteral Tube daily  dextrose 5%. 1000 milliLiter(s) (50 mL/Hr) IV Continuous <Continuous>  dextrose 50% Injectable 12.5 Gram(s) IV Push once  dextrose 50% Injectable 25 Gram(s) IV Push once  dextrose 50% Injectable 25 Gram(s) IV Push once  diltiazem    Tablet 60 milliGRAM(s) Enteral Tube every 6 hours  insulin lispro (HumaLOG) corrective regimen sliding scale   SubCutaneous every 6 hours  lactated ringers. 1000 milliLiter(s) (75 mL/Hr) IV Continuous <Continuous>  potassium chloride    Tablet ER 40 milliEquivalent(s) Oral every 4 hours
unk

## 2022-03-01 NOTE — PROGRESS NOTE ADULT - PROVIDER SPECIALTY LIST ADULT
Hospitalist Complex Repair And Graft Additional Text (Will Appearing After The Standard Complex Repair Text): The complex repair was not sufficient to completely close the primary defect. The remaining additional defect was repaired with the graft mentioned below.

## 2022-06-16 NOTE — PROGRESS NOTE ADULT - PROBLEM SELECTOR PLAN 2
No Converted to SR, controlled HR on cardizem gtt 7.5mg/hr  - monitor HR, if stable in next 12 hours and tolerating PO intake, can transition to PO cardizem 60mg Q6 per current IV dose requirements. Hold for HR <60 or SBP <100. Give PO dose 1 hour prior to d/c of gtt.  - cardiology recommendations appreciated -- agree with Eliquis for A/C if no objections from ortho. Recommend to c/w DVT ppx Lovenox for now, if no bleeding in next 24 hours would start Eliquis at 5mg PO BID

## 2022-09-11 NOTE — PROGRESS NOTE ADULT - PROBLEM SELECTOR PROBLEM 4
Type 2 diabetes mellitus without complication, unspecified whether long term insulin use Evaluation Time: 08:40-09:15am; pt chart thoroughly reviewed prior to OT evaluation/yes

## 2022-09-12 NOTE — SWALLOW BEDSIDE ASSESSMENT ADULT - H & P REVIEW
Patient seen and examined at bedside   Complaining of abdominal pain   Denies BM/Flatus    Vital Signs Last 24 Hrs  T(F): 97.7 (09-12-22 @ 05:00), Max: 97.8 (09-11-22 @ 11:04)  HR: 82 (09-12-22 @ 05:00)  BP: 123/68 (09-12-22 @ 05:00)  RR: 18 (09-12-22 @ 05:00)  SpO2: 97% (09-12-22 @ 05:00)    GENERAL: Alert, NAD  CHEST/LUNG: respirations nonlabored  ABDOMEN: NGT in place with minimal output, obese, hot packs over abdomen, hernia nonreducible, tender to palpation, no skin changes  EXTREMITIES:  no calf tenderness, No edema    I&O's Detail    LABS:                        10.7   9.63  )-----------( 272      ( 12 Sep 2022 06:26 )             37.6     09-12    139  |  102  |  71<H>  ----------------------------<  191<H>  3.8   |  29  |  3.30<H>    Ca    8.9      12 Sep 2022 06:26  Mg     2.1     09-11    TPro  7.8  /  Alb  3.8  /  TBili  0.3  /  DBili  x   /  AST  13  /  ALT  14  /  AlkPhos  71  09-11    PT/INR - ( 11 Sep 2022 12:00 )   PT: 13.2 sec;   INR: 1.11 ratio       PTT - ( 11 Sep 2022 12:00 )  PTT:32.5 sec     yes

## 2023-01-04 NOTE — PHYSICAL THERAPY INITIAL EVALUATION ADULT - PRECAUTIONS/LIMITATIONS, REHAB EVAL
Acetate Template Statement (Will Not Render If Left Blank): The acetate template will be used to fabricate a custom lead on skin shielding device to allow for lead on skin collimation. This type of shielding will best limit beam penumbra and define the field. Custom Bolus Type: custom mixed, molded, and shaped Isodose: [address] Intro Statement (Will Not Render If Left Blank): A new radiation electron beam field was designed to include the gross lesion (GTV) with additional margin that accounted for both potential subclinical microscopic extension (CTV), as well as for the beam characteristics of superficial electrons (PTV). This field took into account the changes in the volume of the tumor that have occurred during radiation therapy. Care was taken in designing the field near adjacent normal tissue structures. The target volume was drawn on the skin surface with a permanent marker and then the design with reference marks was carefully traced onto an acetate template. Digital photographs were taken. Closing Statement (Will Not Render If Left Blank): The patient tolerated the complex electron beam simulation well and will continue on radiotherapy using the modified field. The patient will return for a field verification simulation before radiation is delivered to confirm patient positioning and block fabrication parameters. fall precautions/isolation precautions/surgical precautions Bolus Thickness In Cm: 0.6 Pathology: Use Selected EMA Impression Energy In Mev: 6 Position: supine Detail Level: Simple
